# Patient Record
Sex: FEMALE | Race: WHITE | NOT HISPANIC OR LATINO
[De-identification: names, ages, dates, MRNs, and addresses within clinical notes are randomized per-mention and may not be internally consistent; named-entity substitution may affect disease eponyms.]

---

## 2017-12-21 ENCOUNTER — APPOINTMENT (OUTPATIENT)
Dept: OBGYN | Facility: CLINIC | Age: 52
End: 2017-12-21
Payer: COMMERCIAL

## 2017-12-21 ENCOUNTER — RESULT REVIEW (OUTPATIENT)
Age: 52
End: 2017-12-21

## 2017-12-21 PROCEDURE — 36415 COLL VENOUS BLD VENIPUNCTURE: CPT

## 2017-12-21 PROCEDURE — 99396 PREV VISIT EST AGE 40-64: CPT

## 2018-12-14 ENCOUNTER — LABORATORY RESULT (OUTPATIENT)
Age: 53
End: 2018-12-14

## 2018-12-14 ENCOUNTER — APPOINTMENT (OUTPATIENT)
Dept: PULMONOLOGY | Facility: CLINIC | Age: 53
End: 2018-12-14
Payer: COMMERCIAL

## 2018-12-14 VITALS
RESPIRATION RATE: 17 BRPM | HEIGHT: 62 IN | SYSTOLIC BLOOD PRESSURE: 136 MMHG | TEMPERATURE: 97.9 F | HEART RATE: 66 BPM | OXYGEN SATURATION: 95 % | DIASTOLIC BLOOD PRESSURE: 80 MMHG | BODY MASS INDEX: 25.03 KG/M2 | WEIGHT: 136 LBS

## 2018-12-14 DIAGNOSIS — Z78.9 OTHER SPECIFIED HEALTH STATUS: ICD-10-CM

## 2018-12-14 DIAGNOSIS — I35.1 NONRHEUMATIC AORTIC (VALVE) INSUFFICIENCY: ICD-10-CM

## 2018-12-14 DIAGNOSIS — J30.1 ALLERGIC RHINITIS DUE TO POLLEN: ICD-10-CM

## 2018-12-14 DIAGNOSIS — Z13.820 ENCOUNTER FOR SCREENING FOR OSTEOPOROSIS: ICD-10-CM

## 2018-12-14 DIAGNOSIS — M19.90 UNSPECIFIED OSTEOARTHRITIS, UNSPECIFIED SITE: ICD-10-CM

## 2018-12-14 LAB
ALBUMIN: 10
BASOPHILS # BLD AUTO: 0.05 K/UL
BASOPHILS NFR BLD AUTO: 0.9 %
BILIRUB UR QL STRIP: NEGATIVE
CLARITY UR: CLEAR
COLLECTION METHOD: NORMAL
CREATININE: 50
EOSINOPHIL # BLD AUTO: 0.16 K/UL
EOSINOPHIL NFR BLD AUTO: 2.9 %
GLUCOSE UR-MCNC: NEGATIVE
HCG UR QL: 0.2 EU/DL
HCT VFR BLD CALC: 37.1 %
HGB BLD-MCNC: 11.8 G/DL
HGB UR QL STRIP.AUTO: NEGATIVE
IMM GRANULOCYTES NFR BLD AUTO: 0 %
KETONES UR-MCNC: NEGATIVE
LEUKOCYTE ESTERASE UR QL STRIP: NEGATIVE
LYMPHOCYTES # BLD AUTO: 2.24 K/UL
LYMPHOCYTES NFR BLD AUTO: 40.1 %
MAN DIFF?: NORMAL
MCHC RBC-ENTMCNC: 28.2 PG
MCHC RBC-ENTMCNC: 31.8 GM/DL
MCV RBC AUTO: 88.8 FL
MICROALBUMIN/CREAT UR TEST STR-RTO: 30
MONOCYTES # BLD AUTO: 0.41 K/UL
MONOCYTES NFR BLD AUTO: 7.3 %
NEUTROPHILS # BLD AUTO: 2.72 K/UL
NEUTROPHILS NFR BLD AUTO: 48.8 %
NITRITE UR QL STRIP: NEGATIVE
PH UR STRIP: 7
PLATELET # BLD AUTO: 279 K/UL
POCT - HEMOGLOBIN (HGB), QUANTITATIVE, TRANSCUTANEOUS: 12.2
PROT UR STRIP-MCNC: NEGATIVE
RBC # BLD: 4.18 M/UL
RBC # FLD: 15.4 %
SP GR UR STRIP: 1.01
WBC # FLD AUTO: 5.58 K/UL

## 2018-12-14 PROCEDURE — 77080 DXA BONE DENSITY AXIAL: CPT

## 2018-12-14 PROCEDURE — 94729 DIFFUSING CAPACITY: CPT

## 2018-12-14 PROCEDURE — 94010 BREATHING CAPACITY TEST: CPT

## 2018-12-14 PROCEDURE — 36415 COLL VENOUS BLD VENIPUNCTURE: CPT

## 2018-12-14 PROCEDURE — 94727 GAS DIL/WSHOT DETER LNG VOL: CPT

## 2018-12-14 PROCEDURE — 93000 ELECTROCARDIOGRAM COMPLETE: CPT

## 2018-12-14 PROCEDURE — 82570 ASSAY OF URINE CREATININE: CPT | Mod: QW

## 2018-12-14 PROCEDURE — G0008: CPT

## 2018-12-14 PROCEDURE — 90686 IIV4 VACC NO PRSV 0.5 ML IM: CPT

## 2018-12-14 PROCEDURE — 88738 HGB QUANT TRANSCUTANEOUS: CPT

## 2018-12-14 PROCEDURE — 71046 X-RAY EXAM CHEST 2 VIEWS: CPT

## 2018-12-14 PROCEDURE — 82044 UR ALBUMIN SEMIQUANTITATIVE: CPT | Mod: QW

## 2018-12-14 PROCEDURE — 99396 PREV VISIT EST AGE 40-64: CPT | Mod: 25

## 2018-12-15 LAB
25(OH)D3 SERPL-MCNC: 29.7 NG/ML
ALBUMIN SERPL ELPH-MCNC: 5.1 G/DL
ALP BLD-CCNC: 83 U/L
ALT SERPL-CCNC: 19 U/L
ANION GAP SERPL CALC-SCNC: 14 MMOL/L
AST SERPL-CCNC: 24 U/L
BILIRUB SERPL-MCNC: 0.2 MG/DL
BUN SERPL-MCNC: 14 MG/DL
CALCIUM SERPL-MCNC: 10.2 MG/DL
CHLORIDE SERPL-SCNC: 104 MMOL/L
CHOLEST SERPL-MCNC: 214 MG/DL
CHOLEST/HDLC SERPL: 2.5 RATIO
CO2 SERPL-SCNC: 27 MMOL/L
CREAT SERPL-MCNC: 0.84 MG/DL
GLUCOSE SERPL-MCNC: 93 MG/DL
HBA1C MFR BLD HPLC: 5.2 %
HCV AB SER QL: NONREACTIVE
HCV S/CO RATIO: 0.11 S/CO
HDLC SERPL-MCNC: 86 MG/DL
LDLC SERPL CALC-MCNC: 120 MG/DL
POTASSIUM SERPL-SCNC: 4.2 MMOL/L
PROT SERPL-MCNC: 7.1 G/DL
SODIUM SERPL-SCNC: 145 MMOL/L
T3 SERPL-MCNC: 79 NG/DL
T3RU NFR SERPL: 1.03 INDEX
T4 FREE SERPL-MCNC: 1.5 NG/DL
T4 SERPL-MCNC: 7.8 UG/DL
TRIGL SERPL-MCNC: 41 MG/DL
TSH SERPL-ACNC: 1.9 UIU/ML

## 2018-12-18 LAB — ACE BLD-CCNC: 58 U/L

## 2019-01-18 ENCOUNTER — APPOINTMENT (OUTPATIENT)
Dept: OBGYN | Facility: CLINIC | Age: 54
End: 2019-01-18
Payer: COMMERCIAL

## 2019-01-18 ENCOUNTER — RESULT REVIEW (OUTPATIENT)
Age: 54
End: 2019-01-18

## 2019-01-18 PROCEDURE — 99396 PREV VISIT EST AGE 40-64: CPT

## 2019-03-08 ENCOUNTER — APPOINTMENT (OUTPATIENT)
Dept: DERMATOLOGY | Facility: CLINIC | Age: 54
End: 2019-03-08
Payer: COMMERCIAL

## 2019-03-08 VITALS
BODY MASS INDEX: 24.84 KG/M2 | HEIGHT: 62 IN | DIASTOLIC BLOOD PRESSURE: 72 MMHG | WEIGHT: 135 LBS | SYSTOLIC BLOOD PRESSURE: 110 MMHG

## 2019-03-08 DIAGNOSIS — L81.4 OTHER MELANIN HYPERPIGMENTATION: ICD-10-CM

## 2019-03-08 PROCEDURE — 99243 OFF/OP CNSLTJ NEW/EST LOW 30: CPT

## 2019-07-15 ENCOUNTER — RECORD ABSTRACTING (OUTPATIENT)
Age: 54
End: 2019-07-15

## 2019-07-15 DIAGNOSIS — Z87.39 PERSONAL HISTORY OF OTHER DISEASES OF THE MUSCULOSKELETAL SYSTEM AND CONNECTIVE TISSUE: ICD-10-CM

## 2019-07-15 DIAGNOSIS — N84.0 POLYP OF CORPUS UTERI: ICD-10-CM

## 2019-07-15 DIAGNOSIS — Z83.3 FAMILY HISTORY OF DIABETES MELLITUS: ICD-10-CM

## 2019-07-15 DIAGNOSIS — Z82.49 FAMILY HISTORY OF ISCHEMIC HEART DISEASE AND OTHER DISEASES OF THE CIRCULATORY SYSTEM: ICD-10-CM

## 2019-07-15 DIAGNOSIS — Z86.39 PERSONAL HISTORY OF OTHER ENDOCRINE, NUTRITIONAL AND METABOLIC DISEASE: ICD-10-CM

## 2019-07-15 DIAGNOSIS — H40.033 ANATOMICAL NARROW ANGLE, BILATERAL: ICD-10-CM

## 2019-12-20 ENCOUNTER — APPOINTMENT (OUTPATIENT)
Dept: PULMONOLOGY | Facility: CLINIC | Age: 54
End: 2019-12-20
Payer: COMMERCIAL

## 2019-12-20 ENCOUNTER — NON-APPOINTMENT (OUTPATIENT)
Age: 54
End: 2019-12-20

## 2019-12-20 VITALS
WEIGHT: 137 LBS | HEIGHT: 62 IN | OXYGEN SATURATION: 99 % | SYSTOLIC BLOOD PRESSURE: 107 MMHG | BODY MASS INDEX: 25.21 KG/M2 | RESPIRATION RATE: 16 BRPM | DIASTOLIC BLOOD PRESSURE: 72 MMHG | HEART RATE: 60 BPM

## 2019-12-20 DIAGNOSIS — Z23 ENCOUNTER FOR IMMUNIZATION: ICD-10-CM

## 2019-12-20 LAB
25(OH)D3 SERPL-MCNC: 30 NG/ML
ALBUMIN SERPL ELPH-MCNC: 4.7 G/DL
ALBUMIN: 10
ALP BLD-CCNC: 84 U/L
ALT SERPL-CCNC: 17 U/L
ANION GAP SERPL CALC-SCNC: 11 MMOL/L
AST SERPL-CCNC: 25 U/L
BASOPHILS # BLD AUTO: 0.08 K/UL
BASOPHILS NFR BLD AUTO: 1.4 %
BILIRUB SERPL-MCNC: 0.3 MG/DL
BILIRUB UR QL STRIP: NEGATIVE
BUN SERPL-MCNC: 16 MG/DL
CALCIUM SERPL-MCNC: 9.8 MG/DL
CHLORIDE SERPL-SCNC: 103 MMOL/L
CHOLEST SERPL-MCNC: 204 MG/DL
CHOLEST/HDLC SERPL: 2.3 RATIO
CLARITY UR: CLEAR
CO2 SERPL-SCNC: 27 MMOL/L
CREAT SERPL-MCNC: 0.87 MG/DL
CREATININE: 10
EOSINOPHIL # BLD AUTO: 0.13 K/UL
EOSINOPHIL NFR BLD AUTO: 2.2 %
ESTIMATED AVERAGE GLUCOSE: 111 MG/DL
GLUCOSE SERPL-MCNC: 104 MG/DL
GLUCOSE UR-MCNC: NEGATIVE
HBA1C MFR BLD HPLC: 5.5 %
HCG UR QL: 0.2 EU/DL
HCT VFR BLD CALC: 40.1 %
HDLC SERPL-MCNC: 87 MG/DL
HGB BLD-MCNC: 12.7 G/DL
HGB UR QL STRIP.AUTO: NEGATIVE
IMM GRANULOCYTES NFR BLD AUTO: 0.2 %
KETONES UR-MCNC: NEGATIVE
LDLC SERPL CALC-MCNC: 109 MG/DL
LEUKOCYTE ESTERASE UR QL STRIP: NORMAL
LYMPHOCYTES # BLD AUTO: 2.4 K/UL
LYMPHOCYTES NFR BLD AUTO: 41.4 %
MAN DIFF?: NORMAL
MCHC RBC-ENTMCNC: 29.8 PG
MCHC RBC-ENTMCNC: 31.7 GM/DL
MCV RBC AUTO: 94.1 FL
MICROALBUMIN/CREAT UR TEST STR-RTO: <30
MONOCYTES # BLD AUTO: 0.45 K/UL
MONOCYTES NFR BLD AUTO: 7.8 %
NEUTROPHILS # BLD AUTO: 2.73 K/UL
NEUTROPHILS NFR BLD AUTO: 47 %
NITRITE UR QL STRIP: NEGATIVE
PH UR STRIP: 6
PLATELET # BLD AUTO: 258 K/UL
POCT - HEMOGLOBIN (HGB), QUANTITATIVE, TRANSCUTANEOUS: 12.7
POTASSIUM SERPL-SCNC: 4.6 MMOL/L
PROT SERPL-MCNC: 6.6 G/DL
PROT UR STRIP-MCNC: NEGATIVE
RBC # BLD: 4.26 M/UL
RBC # FLD: 13.4 %
SODIUM SERPL-SCNC: 141 MMOL/L
SP GR UR STRIP: <=1.005
T4 FREE SERPL-MCNC: 1.3 NG/DL
T4 SERPL-MCNC: 7.2 UG/DL
TRIGL SERPL-MCNC: 42 MG/DL
TSH SERPL-ACNC: 2.22 UIU/ML
WBC # FLD AUTO: 5.8 K/UL

## 2019-12-20 PROCEDURE — 94729 DIFFUSING CAPACITY: CPT

## 2019-12-20 PROCEDURE — 93000 ELECTROCARDIOGRAM COMPLETE: CPT

## 2019-12-20 PROCEDURE — 71046 X-RAY EXAM CHEST 2 VIEWS: CPT

## 2019-12-20 PROCEDURE — 82044 UR ALBUMIN SEMIQUANTITATIVE: CPT | Mod: NC,QW

## 2019-12-20 PROCEDURE — 88738 HGB QUANT TRANSCUTANEOUS: CPT

## 2019-12-20 PROCEDURE — 99396 PREV VISIT EST AGE 40-64: CPT | Mod: 25

## 2019-12-20 PROCEDURE — 94010 BREATHING CAPACITY TEST: CPT

## 2019-12-20 PROCEDURE — 81003 URINALYSIS AUTO W/O SCOPE: CPT | Mod: QW

## 2019-12-20 PROCEDURE — 36415 COLL VENOUS BLD VENIPUNCTURE: CPT

## 2019-12-20 PROCEDURE — G0008: CPT

## 2019-12-20 PROCEDURE — 90686 IIV4 VACC NO PRSV 0.5 ML IM: CPT

## 2019-12-20 PROCEDURE — 94727 GAS DIL/WSHOT DETER LNG VOL: CPT

## 2019-12-20 NOTE — PHYSICAL EXAM
[General Appearance - Well Developed] : well developed [Normal Appearance] : normal appearance [Well Groomed] : well groomed [General Appearance - Well Nourished] : well nourished [No Deformities] : no deformities [General Appearance - In No Acute Distress] : no acute distress [Normal Conjunctiva] : the conjunctiva exhibited no abnormalities [Eyelids - No Xanthelasma] : the eyelids demonstrated no xanthelasmas [Normal Oropharynx] : normal oropharynx [I] : I [Neck Appearance] : the appearance of the neck was normal [Neck Cervical Mass (___cm)] : no neck mass was observed [Jugular Venous Distention Increased] : there was no jugular-venous distention [Thyroid Diffuse Enlargement] : the thyroid was not enlarged [Thyroid Nodule] : there were no palpable thyroid nodules [Heart Rate And Rhythm] : heart rate and rhythm were normal [Heart Sounds] : normal S1 and S2 [Murmurs] : no murmurs present [Arterial Pulses Normal] : the arterial pulses were normal [Respiration, Rhythm And Depth] : normal respiratory rhythm and effort [Exaggerated Use Of Accessory Muscles For Inspiration] : no accessory muscle use [Auscultation Breath Sounds / Voice Sounds] : lungs were clear to auscultation bilaterally [Chest Palpation] : palpation of the chest revealed no abnormalities [Lungs Percussion] : the lungs were normal to percussion [Bowel Sounds] : normal bowel sounds [Abdomen Soft] : soft [Abdomen Tenderness] : non-tender [Abdomen Mass (___ Cm)] : no abdominal mass palpated [Gait - Sufficient For Exercise Testing] : the gait was sufficient for exercise testing [Abnormal Walk] : normal gait [Nail Clubbing] : no clubbing of the fingernails [Petechial Hemorrhages (___cm)] : no petechial hemorrhages [Cyanosis, Localized] : no localized cyanosis [] : no ischemic changes [Sensation] : the sensory exam was normal to light touch and pinprick [Deep Tendon Reflexes (DTR)] : deep tendon reflexes were 2+ and symmetric [No Focal Deficits] : no focal deficits [Oriented To Time, Place, And Person] : oriented to person, place, and time [Affect] : the affect was normal [Mood] : the mood was normal [Impaired Insight] : insight and judgment were intact [FreeTextEntry1] : Nonicteric

## 2019-12-20 NOTE — DISCUSSION/SUMMARY
[FreeTextEntry1] : Well visit completed\par Stable sarcoidosis\par History of aortic insufficiency\par His mitral valve prolapse\par Former tobacco smoker\par Flu vaccination administered\par Complete blood work pending\par Patient requests as recommended cardiology evaluation  Everardo Germain MD\par Due for echocardiogram\par Office follow-up 1 year\par Once cleared by cardiology should then embark on aerobic exercise program

## 2019-12-20 NOTE — HISTORY OF PRESENT ILLNESS
[___ Year Quit] : ~He/She~ quit smoking in [unfilled] [Former] : is a former smoker [___ Pack Year History] : [unfilled] pack year history [None] : The patient is not currently on any medications [FreeTextEntry1] :  female well visit\par No active complaints at this time\par Past medical history sarcoidosis\par Diagnosis with with a skin biopsy dating back to 1985\par Borderline hypercholesterolemia\par Hayfever\par Some arthritic changes\par Reported history of aortic insufficiency\par Mild anemia reported\par colonoscopy 2015 - told 10 yr f/u\par  mammogram  Sept Oct 2019\par GYN due\par Past surgical history right knee surgery x2\par Social history minimal tobacco smoker ages [age] less than 1 pack/day\par Alcohol social with wine\par Occupational history unremarkable\par \par No history of toxic chemical inhalational exposures\par Postmenopausal\par Family history both mother and father living patient's questions whether father has diabetes\par Mother she states does not see doctors no medical diseases reported\par Anoscopy she states up-to-date 3 years ago was told to have one 10 years from initial colonoscopy\par Gynecology yearly up-to-date January including Pap smear mammogram\par \par No prescription medications\par Patient patient does take multiple over-the-counter supplements\par Niacin 500 mg twice daily calcium 1200 mg daily iron 65 mg daily vitamin D 50 MCG's daily vitamin C 500 mg twice daily vitamin D 134 mg daily magnesium 25 250 mg daily B12 1000 mg orally fish oil 1000 mg beta-carotene 3000 mg of multivitamin\par

## 2019-12-20 NOTE — PROCEDURE
[FreeTextEntry1] : Chest x-ray PA lateral projection 12/20/2019 history sarcoidosis\par Cardiac size normal\par Lung fields are clear without definite evidence of parenchymal infiltrates dominant pulmonary nodules pleural effusions pneumothorax\par Hilum and mediastinum normal\par Mild left lateral scoliosis\par No prior films for review\par \par \par Bone density December 14, 2018 spine and hip consistent with osteopenia\par \par PFT without bronchodilator 12/202019\par Flow rates normal\par Lung volumes normal\par Diffusion normal 89 % predicted\par HGB  12.7\par \par Blood  draw\par  UA  noted\par \par EKG 12/20/2019\par NSR atypical normal  variant\par

## 2019-12-21 LAB
HCV AB SER QL: NONREACTIVE
HCV S/CO RATIO: 0.14 S/CO

## 2019-12-25 LAB — ACE BLD-CCNC: 57 U/L

## 2020-02-02 ENCOUNTER — FORM ENCOUNTER (OUTPATIENT)
Age: 55
End: 2020-02-02

## 2020-02-03 ENCOUNTER — RESULT REVIEW (OUTPATIENT)
Age: 55
End: 2020-02-03

## 2020-02-03 ENCOUNTER — APPOINTMENT (OUTPATIENT)
Dept: OBGYN | Facility: CLINIC | Age: 55
End: 2020-02-03
Payer: COMMERCIAL

## 2020-02-03 PROCEDURE — 99396 PREV VISIT EST AGE 40-64: CPT

## 2020-02-14 ENCOUNTER — APPOINTMENT (OUTPATIENT)
Dept: CARDIOLOGY | Facility: CLINIC | Age: 55
End: 2020-02-14
Payer: COMMERCIAL

## 2020-02-14 ENCOUNTER — TRANSCRIPTION ENCOUNTER (OUTPATIENT)
Age: 55
End: 2020-02-14

## 2020-02-14 ENCOUNTER — NON-APPOINTMENT (OUTPATIENT)
Age: 55
End: 2020-02-14

## 2020-02-14 VITALS
RESPIRATION RATE: 15 BRPM | HEART RATE: 58 BPM | DIASTOLIC BLOOD PRESSURE: 82 MMHG | BODY MASS INDEX: 25.95 KG/M2 | WEIGHT: 141 LBS | SYSTOLIC BLOOD PRESSURE: 122 MMHG | HEIGHT: 62 IN

## 2020-02-14 DIAGNOSIS — Z87.891 PERSONAL HISTORY OF NICOTINE DEPENDENCE: ICD-10-CM

## 2020-02-14 PROCEDURE — 93000 ELECTROCARDIOGRAM COMPLETE: CPT

## 2020-02-14 PROCEDURE — 99204 OFFICE O/P NEW MOD 45 MIN: CPT

## 2020-06-09 ENCOUNTER — APPOINTMENT (OUTPATIENT)
Dept: CARDIOLOGY | Facility: CLINIC | Age: 55
End: 2020-06-09

## 2020-12-21 ENCOUNTER — APPOINTMENT (OUTPATIENT)
Dept: PULMONOLOGY | Facility: CLINIC | Age: 55
End: 2020-12-21
Payer: COMMERCIAL

## 2020-12-21 ENCOUNTER — NON-APPOINTMENT (OUTPATIENT)
Age: 55
End: 2020-12-21

## 2020-12-21 ENCOUNTER — LABORATORY RESULT (OUTPATIENT)
Age: 55
End: 2020-12-21

## 2020-12-21 VITALS
DIASTOLIC BLOOD PRESSURE: 72 MMHG | HEART RATE: 76 BPM | TEMPERATURE: 98.2 F | SYSTOLIC BLOOD PRESSURE: 116 MMHG | OXYGEN SATURATION: 95 %

## 2020-12-21 DIAGNOSIS — Z20.828 CONTACT WITH AND (SUSPECTED) EXPOSURE TO OTHER VIRAL COMMUNICABLE DISEASES: ICD-10-CM

## 2020-12-21 LAB
25(OH)D3 SERPL-MCNC: 30.9 NG/ML
ALBUMIN SERPL ELPH-MCNC: 4.6 G/DL
ALBUMIN: 10
ALP BLD-CCNC: 87 U/L
ALT SERPL-CCNC: 31 U/L
ANION GAP SERPL CALC-SCNC: 8 MMOL/L
AST SERPL-CCNC: 31 U/L
BASOPHILS # BLD AUTO: 0.06 K/UL
BASOPHILS NFR BLD AUTO: 1.3 %
BILIRUB SERPL-MCNC: 0.4 MG/DL
BILIRUB UR QL STRIP: NEGATIVE
BUN SERPL-MCNC: 11 MG/DL
CALCIUM SERPL-MCNC: 9.6 MG/DL
CHLORIDE SERPL-SCNC: 105 MMOL/L
CHOLEST SERPL-MCNC: 209 MG/DL
CLARITY UR: CLEAR
CO2 SERPL-SCNC: 28 MMOL/L
COLLECTION METHOD: NORMAL
CREAT SERPL-MCNC: 0.95 MG/DL
CREATININE: 100
EOSINOPHIL # BLD AUTO: 0.14 K/UL
EOSINOPHIL NFR BLD AUTO: 2.9 %
ESTIMATED AVERAGE GLUCOSE: 105 MG/DL
GLUCOSE SERPL-MCNC: 104 MG/DL
GLUCOSE UR-MCNC: NEGATIVE
HBA1C MFR BLD HPLC: 5.3 %
HCG UR QL: 0.2 EU/DL
HCT VFR BLD CALC: 37.7 %
HCV AB SER QL: NONREACTIVE
HCV S/CO RATIO: 0.1 S/CO
HDLC SERPL-MCNC: 78 MG/DL
HGB BLD-MCNC: 12.1 G/DL
HGB UR QL STRIP.AUTO: NEGATIVE
IMM GRANULOCYTES NFR BLD AUTO: 0.2 %
KETONES UR-MCNC: NEGATIVE
LDLC SERPL CALC-MCNC: 117 MG/DL
LEUKOCYTE ESTERASE UR QL STRIP: NORMAL
LYMPHOCYTES # BLD AUTO: 2.03 K/UL
LYMPHOCYTES NFR BLD AUTO: 42.5 %
MAN DIFF?: NORMAL
MCHC RBC-ENTMCNC: 29.9 PG
MCHC RBC-ENTMCNC: 32.1 GM/DL
MCV RBC AUTO: 93.1 FL
MICROALBUMIN/CREAT UR TEST STR-RTO: 30
MONOCYTES # BLD AUTO: 0.37 K/UL
MONOCYTES NFR BLD AUTO: 7.7 %
NEUTROPHILS # BLD AUTO: 2.17 K/UL
NEUTROPHILS NFR BLD AUTO: 45.4 %
NITRITE UR QL STRIP: NEGATIVE
NONHDLC SERPL-MCNC: 131 MG/DL
PH UR STRIP: 7.5
PLATELET # BLD AUTO: 231 K/UL
POTASSIUM SERPL-SCNC: 4.2 MMOL/L
PROT SERPL-MCNC: 6.4 G/DL
PROT UR STRIP-MCNC: NEGATIVE
PSA SERPL-MCNC: <0.01 NG/ML
RBC # BLD: 4.05 M/UL
RBC # FLD: 13.2 %
SODIUM SERPL-SCNC: 141 MMOL/L
SP GR UR STRIP: 1.02
T4 FREE SERPL-MCNC: 1.3 NG/DL
T4 SERPL-MCNC: 6.9 UG/DL
TRIGL SERPL-MCNC: 71 MG/DL
TSH SERPL-ACNC: 2.22 UIU/ML
WBC # FLD AUTO: 4.78 K/UL

## 2020-12-21 PROCEDURE — 36415 COLL VENOUS BLD VENIPUNCTURE: CPT

## 2020-12-21 PROCEDURE — 82044 UR ALBUMIN SEMIQUANTITATIVE: CPT | Mod: NC,QW

## 2020-12-21 PROCEDURE — 77085 DXA BONE DENSITY AXL VRT FX: CPT

## 2020-12-21 PROCEDURE — 99072 ADDL SUPL MATRL&STAF TM PHE: CPT

## 2020-12-21 PROCEDURE — 71046 X-RAY EXAM CHEST 2 VIEWS: CPT

## 2020-12-21 PROCEDURE — 99396 PREV VISIT EST AGE 40-64: CPT | Mod: 25

## 2020-12-21 PROCEDURE — 81003 URINALYSIS AUTO W/O SCOPE: CPT | Mod: QW

## 2020-12-21 PROCEDURE — 93000 ELECTROCARDIOGRAM COMPLETE: CPT

## 2020-12-21 RX ORDER — HYDROCORTISONE 25 MG/G
2.5 CREAM TOPICAL
Qty: 30 | Refills: 0 | Status: DISCONTINUED | COMMUNITY
Start: 2020-02-03

## 2020-12-21 NOTE — PROCEDURE
[FreeTextEntry1] : X-ray PA lateral December 21, 2020\par History sarcoidosis\par Normal cardiac size\par Left lateral rotary scoliosis\par Clear lungs\par No parenchymal infiltrates pleural effusions dominant pulmonary nodules\par Jen mediastinum unremarkable\par Interval change compared to a chest x-ray of December 20, 2019\par \par EKG 12/21/20\par NSR\par \par DEXA 12/21/2020\par AP Spine osteoporosis\par HIP Left Osteopenia\par \par Bone density December 14, 2018 spine and hip consistent with osteopenia\par \par PFT without bronchodilator 12/202019\par Flow rates normal\par Lung volumes normal\par Diffusion normal 89 % predicted\par HGB  12.7\par \par Blood  draw\par  UA  noted\par \par EKG 12/20/2019\par NSR atypical normal  variant\par

## 2020-12-21 NOTE — HISTORY OF PRESENT ILLNESS
[Former] : is a former smoker [___ Year Quit] : ~He/She~ quit smoking in [unfilled] [___ Pack Year History] : [unfilled] pack year history [None] : The patient is not currently on any medications [FreeTextEntry1] :  female well visit\par No active complaints at this time\par Past medical history sarcoidosis\par Diagnosis with with a skin biopsy dating back to 1985\par Borderline hypercholesterolemia\par Hayfever\par Some arthritic changes\par Reported history of aortic insufficiency\par Mild anemia reported\par colonoscopy 2015 - told 10 yr f/u\par  mammogram  Sept Oct 2019\par GYN due\par Past surgical history right knee surgery x2\par Social history minimal tobacco smoker ages [age] less than 1 pack/day\par Alcohol social with wine\par Occupational history unremarkable\par \par No history of toxic chemical inhalational exposures\par Postmenopausal\par Family history both mother and father living patient's questions whether father has diabetes\par Mother she states does not see doctors no medical diseases reported\par Anoscopy she states up-to-date 3 years ago was told to have one 10 years from initial colonoscopy\par Gynecology yearly up-to-date January including Pap smear mammogram\par \par No prescription medications\par Patient patient does take multiple over-the-counter supplements\par Niacin 500 mg twice daily calcium 1200 mg daily iron 65 mg daily vitamin D 50 MCG's daily vitamin C 500 mg twice daily vitamin D 134 mg daily magnesium 25 250 mg daily B12 1000 mg orally fish oil 1000 mg beta-carotene 3000 mg of multivitamin\par

## 2020-12-21 NOTE — PHYSICAL EXAM
[General Appearance - Well Developed] : well developed [Normal Appearance] : normal appearance [Well Groomed] : well groomed [General Appearance - Well Nourished] : well nourished [No Deformities] : no deformities [General Appearance - In No Acute Distress] : no acute distress [Normal Conjunctiva] : the conjunctiva exhibited no abnormalities [Eyelids - No Xanthelasma] : the eyelids demonstrated no xanthelasmas [Normal Oropharynx] : normal oropharynx [I] : I [Neck Appearance] : the appearance of the neck was normal [Neck Cervical Mass (___cm)] : no neck mass was observed [Jugular Venous Distention Increased] : there was no jugular-venous distention [Thyroid Diffuse Enlargement] : the thyroid was not enlarged [Thyroid Nodule] : there were no palpable thyroid nodules [Heart Rate And Rhythm] : heart rate and rhythm were normal [Heart Sounds] : normal S1 and S2 [Murmurs] : no murmurs present [Arterial Pulses Normal] : the arterial pulses were normal [Respiration, Rhythm And Depth] : normal respiratory rhythm and effort [Exaggerated Use Of Accessory Muscles For Inspiration] : no accessory muscle use [Auscultation Breath Sounds / Voice Sounds] : lungs were clear to auscultation bilaterally [Chest Palpation] : palpation of the chest revealed no abnormalities [Lungs Percussion] : the lungs were normal to percussion [Bowel Sounds] : normal bowel sounds [Abdomen Soft] : soft [Abdomen Tenderness] : non-tender [Abdomen Mass (___ Cm)] : no abdominal mass palpated [Abnormal Walk] : normal gait [Gait - Sufficient For Exercise Testing] : the gait was sufficient for exercise testing [Nail Clubbing] : no clubbing of the fingernails [Cyanosis, Localized] : no localized cyanosis [Petechial Hemorrhages (___cm)] : no petechial hemorrhages [] : no ischemic changes [Deep Tendon Reflexes (DTR)] : deep tendon reflexes were 2+ and symmetric [Sensation] : the sensory exam was normal to light touch and pinprick [No Focal Deficits] : no focal deficits [Oriented To Time, Place, And Person] : oriented to person, place, and time [Impaired Insight] : insight and judgment were intact [Affect] : the affect was normal [Mood] : the mood was normal [FreeTextEntry1] : Nonicteric

## 2020-12-21 NOTE — DISCUSSION/SUMMARY
[FreeTextEntry1] : Well visit completed\par Osteoporsis\par Stable sarcoidosis\par History of aortic insufficiency\par  mitral valve prolapse\par Former tobacco smoker\par Flu vaccination administered\par Complete blood work pending\par cardiology evaluation  Everardo Germain MD\par Office follow-up 1 year\par ENDO- consult tx protocol\par declined PFT

## 2020-12-22 LAB
SARS-COV-2 IGG SERPL IA-ACNC: <0.1 INDEX
SARS-COV-2 IGG SERPL QL IA: NEGATIVE

## 2020-12-23 ENCOUNTER — APPOINTMENT (OUTPATIENT)
Dept: CARDIOLOGY | Facility: CLINIC | Age: 55
End: 2020-12-23

## 2020-12-24 LAB — ACE BLD-CCNC: 61 U/L

## 2021-02-10 ENCOUNTER — RESULT REVIEW (OUTPATIENT)
Age: 56
End: 2021-02-10

## 2021-02-10 ENCOUNTER — FORM ENCOUNTER (OUTPATIENT)
Age: 56
End: 2021-02-10

## 2021-02-11 ENCOUNTER — APPOINTMENT (OUTPATIENT)
Dept: OBGYN | Facility: CLINIC | Age: 56
End: 2021-02-11
Payer: COMMERCIAL

## 2021-02-11 ENCOUNTER — FORM ENCOUNTER (OUTPATIENT)
Age: 56
End: 2021-02-11

## 2021-02-11 DIAGNOSIS — K64.9 UNSPECIFIED HEMORRHOIDS: ICD-10-CM

## 2021-02-11 PROCEDURE — 99396 PREV VISIT EST AGE 40-64: CPT

## 2021-02-11 PROCEDURE — 99072 ADDL SUPL MATRL&STAF TM PHE: CPT

## 2021-02-16 ENCOUNTER — FORM ENCOUNTER (OUTPATIENT)
Age: 56
End: 2021-02-16

## 2021-03-03 ENCOUNTER — FORM ENCOUNTER (OUTPATIENT)
Age: 56
End: 2021-03-03

## 2021-03-29 ENCOUNTER — FORM ENCOUNTER (OUTPATIENT)
Age: 56
End: 2021-03-29

## 2021-07-06 ENCOUNTER — FORM ENCOUNTER (OUTPATIENT)
Age: 56
End: 2021-07-06

## 2021-12-16 ENCOUNTER — NON-APPOINTMENT (OUTPATIENT)
Age: 56
End: 2021-12-16

## 2021-12-16 ENCOUNTER — APPOINTMENT (OUTPATIENT)
Dept: PULMONOLOGY | Facility: CLINIC | Age: 56
End: 2021-12-16
Payer: COMMERCIAL

## 2021-12-16 VITALS
BODY MASS INDEX: 24.84 KG/M2 | OXYGEN SATURATION: 100 % | HEIGHT: 62 IN | RESPIRATION RATE: 16 BRPM | HEART RATE: 74 BPM | WEIGHT: 135 LBS | SYSTOLIC BLOOD PRESSURE: 126 MMHG | DIASTOLIC BLOOD PRESSURE: 80 MMHG | TEMPERATURE: 97.8 F

## 2021-12-16 DIAGNOSIS — R82.90 UNSPECIFIED ABNORMAL FINDINGS IN URINE: ICD-10-CM

## 2021-12-16 DIAGNOSIS — I35.1 NONRHEUMATIC AORTIC (VALVE) INSUFFICIENCY: ICD-10-CM

## 2021-12-16 LAB
25(OH)D3 SERPL-MCNC: 30.9 NG/ML
ALBUMIN SERPL ELPH-MCNC: 4.6 G/DL
ALBUMIN: 10
ALP BLD-CCNC: 53 U/L
ALT SERPL-CCNC: 15 U/L
ANION GAP SERPL CALC-SCNC: 13 MMOL/L
AST SERPL-CCNC: 22 U/L
BASOPHILS # BLD AUTO: 0.05 K/UL
BASOPHILS NFR BLD AUTO: 1 %
BILIRUB SERPL-MCNC: 0.3 MG/DL
BILIRUB UR QL STRIP: NORMAL
BUN SERPL-MCNC: 11 MG/DL
CALCIUM SERPL-MCNC: 9.6 MG/DL
CHLORIDE SERPL-SCNC: 103 MMOL/L
CHOLEST SERPL-MCNC: 204 MG/DL
CLARITY UR: CLEAR
CO2 SERPL-SCNC: 24 MMOL/L
COLLECTION METHOD: NORMAL
COVID-19 NUCLEOCAPSID  GAM ANTIBODY INTERPRETATION: NEGATIVE
COVID-19 SPIKE DOMAIN ANTIBODY INTERPRETATION: POSITIVE
CREAT SERPL-MCNC: 0.84 MG/DL
CREATININE: 100
EOSINOPHIL # BLD AUTO: 0.11 K/UL
EOSINOPHIL NFR BLD AUTO: 2.2 %
GLUCOSE SERPL-MCNC: 103 MG/DL
GLUCOSE UR-MCNC: NORMAL
HCG UR QL: 0.2 EU/DL
HCT VFR BLD CALC: 40.5 %
HDLC SERPL-MCNC: 78 MG/DL
HGB BLD-MCNC: 12.9 G/DL
HGB UR QL STRIP.AUTO: NORMAL
IMM GRANULOCYTES NFR BLD AUTO: 0.2 %
KETONES UR-MCNC: NORMAL
LDLC SERPL CALC-MCNC: 113 MG/DL
LEUKOCYTE ESTERASE UR QL STRIP: NORMAL
LYMPHOCYTES # BLD AUTO: 1.97 K/UL
LYMPHOCYTES NFR BLD AUTO: 39.4 %
MAN DIFF?: NORMAL
MCHC RBC-ENTMCNC: 29.7 PG
MCHC RBC-ENTMCNC: 31.9 GM/DL
MCV RBC AUTO: 93.3 FL
MICROALBUMIN/CREAT UR TEST STR-RTO: 30
MONOCYTES # BLD AUTO: 0.4 K/UL
MONOCYTES NFR BLD AUTO: 8 %
NEUTROPHILS # BLD AUTO: 2.46 K/UL
NEUTROPHILS NFR BLD AUTO: 49.2 %
NITRITE UR QL STRIP: NORMAL
NONHDLC SERPL-MCNC: 126 MG/DL
PH UR STRIP: 7.5
PLATELET # BLD AUTO: 264 K/UL
POTASSIUM SERPL-SCNC: 4.7 MMOL/L
PROT SERPL-MCNC: 6.6 G/DL
PROT UR STRIP-MCNC: NORMAL
RBC # BLD: 4.34 M/UL
RBC # FLD: 13.1 %
SARS-COV-2 AB SERPL IA-ACNC: >250 U/ML
SARS-COV-2 AB SERPL QL IA: 0.09 INDEX
SODIUM SERPL-SCNC: 140 MMOL/L
SP GR UR STRIP: 1.01
T4 SERPL-MCNC: 7.4 UG/DL
TRIGL SERPL-MCNC: 62 MG/DL
TSH SERPL-ACNC: 2.82 UIU/ML
WBC # FLD AUTO: 5 K/UL

## 2021-12-16 PROCEDURE — 36415 COLL VENOUS BLD VENIPUNCTURE: CPT

## 2021-12-16 PROCEDURE — 99396 PREV VISIT EST AGE 40-64: CPT | Mod: 25

## 2021-12-16 PROCEDURE — 81003 URINALYSIS AUTO W/O SCOPE: CPT | Mod: QW

## 2021-12-16 PROCEDURE — 71046 X-RAY EXAM CHEST 2 VIEWS: CPT

## 2021-12-16 PROCEDURE — 82044 UR ALBUMIN SEMIQUANTITATIVE: CPT | Mod: NC,QW

## 2021-12-16 PROCEDURE — 93000 ELECTROCARDIOGRAM COMPLETE: CPT

## 2021-12-16 NOTE — HISTORY OF PRESENT ILLNESS
[Former] : is a former smoker [___ Year Quit] : ~He/She~ quit smoking in [unfilled] [___ Pack Year History] : [unfilled] pack year history [None] : The patient is not currently on any medications [FreeTextEntry1] :  female well visit\par No active complaints at this time\par Past medical history sarcoidosis\par Diagnosis with with a skin biopsy dating back to 1985\par Borderline hypercholesterolemia\par Hayfever\par Some arthritic changes\par Reported history of aortic insufficiency\par Mild anemia reported\par colonoscopy 2015 - told 10 yr f/u\par  mammogram  pt states up to date with GYN\par Past surgical history right knee surgery x2\par Social history minimal tobacco smoker ages [age] less than 1 pack/day\par Alcohol social with wine\par Occupational history unremarkable\par \par No history of toxic chemical inhalational exposures\par Postmenopausal\par Family history both mother and father living patient's questions whether father has diabetes\par Mother she states does not see doctors no medical diseases reported\par Anoscopy she states up-to-date 3 years ago was told to have one 10 years from initial colonoscopy\par Gynecology yearly up-to-date January including Pap smear mammogram\par \par No prescription medications\par Patient patient does take multiple over-the-counter supplements\par Niacin 500 mg twice daily calcium 1200 mg daily iron 65 mg daily vitamin D 50 MCG's daily vitamin C 500 mg twice daily vitamin D 134 mg daily magnesium 25 250 mg daily B12 1000 mg orally fish oil 1000 mg beta-carotene 3000 mg of multivitamin\par \par COVID PFIZER plus booster

## 2021-12-16 NOTE — PROCEDURE
[FreeTextEntry1] : Chest x-ray PA lateral December 16, 2021\par Sarcoidosis\par Cardiac size normal\par Clear lung fields\par  No Infiltrates pleural effusions with dominant pulmonary nodules\par Left lateral scoliosis\par Lamination right hemidiaphragm\par No appreciable dominant pulmonary nodules\par Jen mediastinum otherwise unremarkable\par \par EKG 12/16/21 \par NSR\par \par X-ray PA lateral December 21, 2020\par History sarcoidosis\par Normal cardiac size\par Left lateral rotary scoliosis\par Clear lungs\par No parenchymal infiltrates pleural effusions dominant pulmonary nodules\par Jen mediastinum unremarkable\par Interval change compared to a chest x-ray of December 20, 2019\par \par EKG 12/21/20\par NSR\par \par DEXA 12/21/2020\par AP Spine osteoporosis\par HIP Left Osteopenia\par \par Bone density December 14, 2018 spine and hip consistent with osteopenia\par \par PFT without bronchodilator 12/202019\par Flow rates normal\par Lung volumes normal\par Diffusion normal 89 % predicted\par HGB  12.7\par \par Blood  draw\par  UA  noted\par \par EKG 12/20/2019\par NSR atypical normal  variant\par

## 2021-12-17 LAB
ACE BLD-CCNC: 53 U/L
ESTIMATED AVERAGE GLUCOSE: 114 MG/DL
HBA1C MFR BLD HPLC: 5.6 %

## 2021-12-18 LAB — BACTERIA UR CULT: NORMAL

## 2022-03-16 PROBLEM — K64.9 HEMORRHOIDS, UNSPECIFIED HEMORRHOID TYPE: Status: ACTIVE | Noted: 2019-07-15

## 2022-05-10 ENCOUNTER — APPOINTMENT (OUTPATIENT)
Dept: OBGYN | Facility: CLINIC | Age: 57
End: 2022-05-10
Payer: COMMERCIAL

## 2022-05-10 VITALS
DIASTOLIC BLOOD PRESSURE: 75 MMHG | SYSTOLIC BLOOD PRESSURE: 118 MMHG | HEIGHT: 62 IN | BODY MASS INDEX: 24.66 KG/M2 | WEIGHT: 134 LBS | HEART RATE: 69 BPM

## 2022-05-10 PROCEDURE — 99396 PREV VISIT EST AGE 40-64: CPT

## 2022-05-10 NOTE — HISTORY OF PRESENT ILLNESS
[Patient reported mammogram was normal] : Patient reported mammogram was normal [Patient reported PAP Smear was normal] : Patient reported PAP Smear was normal [Patient reported bone density results were normal] : Patient reported bone density results were normal [Patient reported colonoscopy was normal] : Patient reported colonoscopy was normal [FreeTextEntry1] : SCOTT PICKETT is a 56 year old presenting for annual. pt is doing well has no complaints.\par \par ob hx:  x2  [Mammogramdate] : 3/2021 [PapSmeardate] : 2/2021 [BoneDensityDate] : 2/2021 [ColonoscopyDate] : 2021

## 2022-05-10 NOTE — END OF VISIT
[FreeTextEntry3] : I, Zainab Moss, acted solely as a scribe for Dr. Violet Hinojosa, on 05/10/2022. \par \par All medical record entries made by the scribe were at my, Dr. Violet Hinojosa., direction and personally dictated by me on 05/10/2022. I have personally reviewed the chart and agree that the record accurately reflects my personal performance of the history, physical exam, assessment and plan.\par

## 2022-05-10 NOTE — PLAN
[FreeTextEntry1] : SCOTT PICKETT is a 56 year old presenting for annual\par -pap was done today \par -BSE\par -rx for mammo/sono\par -colon up to date\par -DEXA UTD (followed by endo)\par -RTO 1 year

## 2022-05-12 LAB — HPV HIGH+LOW RISK DNA PNL CVX: NOT DETECTED

## 2022-05-17 ENCOUNTER — TRANSCRIPTION ENCOUNTER (OUTPATIENT)
Age: 57
End: 2022-05-17

## 2022-05-17 LAB — CYTOLOGY CVX/VAG DOC THIN PREP: ABNORMAL

## 2022-06-29 ENCOUNTER — RESULT REVIEW (OUTPATIENT)
Age: 57
End: 2022-06-29

## 2022-06-29 ENCOUNTER — APPOINTMENT (OUTPATIENT)
Dept: ULTRASOUND IMAGING | Facility: CLINIC | Age: 57
End: 2022-06-29

## 2022-06-29 ENCOUNTER — APPOINTMENT (OUTPATIENT)
Dept: MAMMOGRAPHY | Facility: CLINIC | Age: 57
End: 2022-06-29

## 2022-06-29 PROCEDURE — 77067 SCR MAMMO BI INCL CAD: CPT

## 2022-06-29 PROCEDURE — 76641 ULTRASOUND BREAST COMPLETE: CPT | Mod: 50

## 2022-06-29 PROCEDURE — 77063 BREAST TOMOSYNTHESIS BI: CPT

## 2022-12-30 ENCOUNTER — APPOINTMENT (OUTPATIENT)
Dept: PULMONOLOGY | Facility: CLINIC | Age: 57
End: 2022-12-30
Payer: COMMERCIAL

## 2022-12-30 ENCOUNTER — NON-APPOINTMENT (OUTPATIENT)
Age: 57
End: 2022-12-30

## 2022-12-30 VITALS
HEART RATE: 59 BPM | OXYGEN SATURATION: 99 % | SYSTOLIC BLOOD PRESSURE: 108 MMHG | HEIGHT: 62 IN | TEMPERATURE: 97.4 F | WEIGHT: 130 LBS | BODY MASS INDEX: 23.92 KG/M2 | DIASTOLIC BLOOD PRESSURE: 68 MMHG

## 2022-12-30 DIAGNOSIS — M81.0 AGE-RELATED OSTEOPOROSIS W/OUT CURRENT PATHOLOGICAL FRACTURE: ICD-10-CM

## 2022-12-30 LAB
ALBUMIN: 10
BILIRUB UR QL STRIP: NORMAL
CLARITY UR: CLEAR
COLLECTION METHOD: NORMAL
CREATININE: 10
GLUCOSE UR-MCNC: NORMAL
HCG UR QL: 0.2 EU/DL
HGB UR QL STRIP.AUTO: NORMAL
KETONES UR-MCNC: NORMAL
LEUKOCYTE ESTERASE UR QL STRIP: NORMAL
MICROALBUMIN/CREAT UR TEST STR-RTO: <30
NITRITE UR QL STRIP: NORMAL
PH UR STRIP: 6.5
POCT - HEMOGLOBIN (HGB), QUANTITATIVE, TRANSCUTANEOUS: 13.4
PROT UR STRIP-MCNC: NORMAL
SP GR UR STRIP: 1.01

## 2022-12-30 PROCEDURE — 82044 UR ALBUMIN SEMIQUANTITATIVE: CPT | Mod: NC,QW

## 2022-12-30 PROCEDURE — 94727 GAS DIL/WSHOT DETER LNG VOL: CPT

## 2022-12-30 PROCEDURE — ZZZZZ: CPT

## 2022-12-30 PROCEDURE — 36415 COLL VENOUS BLD VENIPUNCTURE: CPT

## 2022-12-30 PROCEDURE — 94729 DIFFUSING CAPACITY: CPT

## 2022-12-30 PROCEDURE — 93000 ELECTROCARDIOGRAM COMPLETE: CPT | Mod: 59

## 2022-12-30 PROCEDURE — 94010 BREATHING CAPACITY TEST: CPT

## 2022-12-30 PROCEDURE — 77085 DXA BONE DENSITY AXL VRT FX: CPT

## 2022-12-30 PROCEDURE — 81003 URINALYSIS AUTO W/O SCOPE: CPT | Mod: QW

## 2022-12-30 PROCEDURE — 71046 X-RAY EXAM CHEST 2 VIEWS: CPT

## 2022-12-30 PROCEDURE — 88738 HGB QUANT TRANSCUTANEOUS: CPT

## 2022-12-30 PROCEDURE — 99396 PREV VISIT EST AGE 40-64: CPT | Mod: 25

## 2022-12-30 RX ORDER — NIACIN 1000 MG/1
1000 TABLET, EXTENDED RELEASE ORAL TWICE DAILY
Refills: 0 | Status: DISCONTINUED | COMMUNITY
Start: 2020-02-17 | End: 2022-12-30

## 2022-12-30 NOTE — HISTORY OF PRESENT ILLNESS
[___ Year Quit] : ~He/She~ quit smoking in [unfilled] [___ Pack Year History] : [unfilled] pack year history [None] : The patient is not currently on any medications [Former] : former [< 20 pack-years] : < 20 pack-years [TextBox_29] : Discontinued all smoking in her mid 20s [FreeTextEntry1] :  female well visit\par GYN up to date\par No active complaints at this time\par Past medical history sarcoidosis\par Diagnosis with with a skin biopsy dating back to 1985\par Borderline hypercholesterolemia\par Hayfever\par Some arthritic changes\par Reported history of aortic insufficiency\par Mild anemia reported\par colonoscopy 2015 - told 10 yr f/u\par  mammogram  pt states up to date with GYN\par Past surgical history right knee surgery x2\par Social history minimal tobacco smoker ages [age] less than 1 pack/day\par Alcohol social with wine\par Occupational history unremarkable\par \par No history of toxic chemical inhalational exposures\par Postmenopausal\par Family history both mother and father living patient's questions whether father has diabetes\par Mother she states does not see doctors no medical diseases reported\par Anoscopy she states up-to-date 3 years ago was told to have one 10 years from initial colonoscopy\par Gynecology yearly up-to-date January including Pap smear mammogram\par \par No prescription medications\par Patient patient does take multiple over-the-counter supplements\par Niacin 500 mg twice daily calcium 1200 mg daily iron 65 mg daily vitamin D 50 MCG's daily vitamin C 500 mg twice daily vitamin D 134 mg daily magnesium 25 250 mg daily B12 1000 mg orally fish oil 1000 mg beta-carotene 3000 mg of multivitamin\par \par COVID PFIZER plus booster

## 2022-12-30 NOTE — DISCUSSION/SUMMARY
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

## 2022-12-30 NOTE — PROCEDURE
[FreeTextEntry1] : PFT December 30, 2022\par Indication sarcoidosis\par Flow rates normal\par Lung volumes normal\par TLC 97% predicted\par Diffusion normal range 88% predicted\par Hemoglobin 13.4\par \par EKG 12/30/2022\par Heart rate 56\par Sinus bradycardia\par Occasional ectopy\par Within normal limits\par [place] QT intervals normal range\par \par \par Bone density 12/30/2022\par AP spine L1-L4 osteopenia\par Left femoral neck osteopenia\par Total left hip normal\par Compared to data of 12/21/2020 the AP spine L1-L4 demonstrates interval improvement of the T score and interval improvement of the total left hip\par Follow-up 2 years\par \par Chest x-ray PA lateral 12/30/2022\par Cardiac size normal\par Mild left lateral scoliosis\par Clear lung fields without parenchymal infiltrates pleural effusions dominant pulmonary nodules\par No pneumothorax\par Jen mediastinum unremarkable no appreciable mediastinal widening\par Impression clear lungs\par Mild scoliosis\par \par EKG 12/16/21 \par NSR\par \par X-ray PA lateral December 21, 2020\par History sarcoidosis\par Normal cardiac size\par Left lateral rotary scoliosis\par Clear lungs\par No parenchymal infiltrates pleural effusions dominant pulmonary nodules\par Jen mediastinum unremarkable\par Interval change compared to a chest x-ray of December 20, 2019\par \par EKG 12/21/20\par NSR\par \par DEXA 12/21/2020\par AP Spine osteoporosis\par HIP Left Osteopenia\par \par Bone density December 14, 2018 spine and hip consistent with osteopenia\par \par PFT without bronchodilator 12/202019\par Flow rates normal\par Lung volumes normal\par Diffusion normal 89 % predicted\par HGB  12.7\par \par Blood  draw\par  UA  noted\par \par EKG 12/20/2019\par NSR atypical normal  variant\par

## 2022-12-31 ENCOUNTER — NON-APPOINTMENT (OUTPATIENT)
Age: 57
End: 2022-12-31

## 2022-12-31 LAB
ALBUMIN SERPL ELPH-MCNC: 4.9 G/DL
ALP BLD-CCNC: 51 U/L
ALT SERPL-CCNC: 21 U/L
ANION GAP SERPL CALC-SCNC: 12 MMOL/L
AST SERPL-CCNC: 26 U/L
BASOPHILS # BLD AUTO: 0.05 K/UL
BASOPHILS NFR BLD AUTO: 0.9 %
BILIRUB DIRECT SERPL-MCNC: 0.1 MG/DL
BILIRUB INDIRECT SERPL-MCNC: 0.3 MG/DL
BILIRUB SERPL-MCNC: 0.4 MG/DL
BUN SERPL-MCNC: 16 MG/DL
CALCIUM SERPL-MCNC: 9.8 MG/DL
CHLORIDE SERPL-SCNC: 103 MMOL/L
CHOLEST SERPL-MCNC: 263 MG/DL
CO2 SERPL-SCNC: 25 MMOL/L
COVID-19 NUCLEOCAPSID  GAM ANTIBODY INTERPRETATION: NEGATIVE
CREAT SERPL-MCNC: 0.82 MG/DL
EGFR: 82 ML/MIN/1.73M2
EOSINOPHIL # BLD AUTO: 0.13 K/UL
EOSINOPHIL NFR BLD AUTO: 2.2 %
ESTIMATED AVERAGE GLUCOSE: 108 MG/DL
GLUCOSE SERPL-MCNC: 95 MG/DL
HBA1C MFR BLD HPLC: 5.4 %
HCT VFR BLD CALC: 42.2 %
HCV AB SER QL: NONREACTIVE
HCV S/CO RATIO: 0.07 S/CO
HDLC SERPL-MCNC: 85 MG/DL
HGB BLD-MCNC: 13.3 G/DL
IMM GRANULOCYTES NFR BLD AUTO: 0.2 %
LDLC SERPL CALC-MCNC: 165 MG/DL
LYMPHOCYTES # BLD AUTO: 1.97 K/UL
LYMPHOCYTES NFR BLD AUTO: 33.8 %
MAN DIFF?: NORMAL
MCHC RBC-ENTMCNC: 30.1 PG
MCHC RBC-ENTMCNC: 31.5 GM/DL
MCV RBC AUTO: 95.5 FL
MONOCYTES # BLD AUTO: 0.44 K/UL
MONOCYTES NFR BLD AUTO: 7.6 %
NEUTROPHILS # BLD AUTO: 3.22 K/UL
NEUTROPHILS NFR BLD AUTO: 55.3 %
NONHDLC SERPL-MCNC: 178 MG/DL
PLATELET # BLD AUTO: 247 K/UL
POTASSIUM SERPL-SCNC: 4.9 MMOL/L
PROT SERPL-MCNC: 6.5 G/DL
RBC # BLD: 4.42 M/UL
RBC # FLD: 13.2 %
SARS-COV-2 AB SERPL QL IA: 0.08 INDEX
SODIUM SERPL-SCNC: 140 MMOL/L
T4 FREE SERPL-MCNC: 1.2 NG/DL
T4 SERPL-MCNC: 7.9 UG/DL
TRIGL SERPL-MCNC: 65 MG/DL
TSH SERPL-ACNC: 1.81 UIU/ML
WBC # FLD AUTO: 5.82 K/UL

## 2023-01-03 LAB — ACE BLD-CCNC: 51 U/L

## 2023-02-17 ENCOUNTER — APPOINTMENT (OUTPATIENT)
Dept: PULMONOLOGY | Facility: CLINIC | Age: 58
End: 2023-02-17
Payer: COMMERCIAL

## 2023-02-17 VITALS — HEART RATE: 59 BPM | SYSTOLIC BLOOD PRESSURE: 110 MMHG | DIASTOLIC BLOOD PRESSURE: 66 MMHG | OXYGEN SATURATION: 100 %

## 2023-02-17 PROCEDURE — 36415 COLL VENOUS BLD VENIPUNCTURE: CPT

## 2023-02-17 PROCEDURE — 99213 OFFICE O/P EST LOW 20 MIN: CPT | Mod: 25

## 2023-02-17 NOTE — PROCEDURE
[FreeTextEntry1] : PFT December 30, 2022\par Indication sarcoidosis\par Flow rates normal\par Lung volumes normal\par TLC 97% predicted\par Diffusion normal range 88% predicted\par Hemoglobin 13.4\par \par EKG 12/30/2022\par Heart rate 56\par Sinus bradycardia\par Occasional ectopy\par Within normal limits\par DC QT intervals normal range\par \par \par Bone density 12/30/2022\par AP spine L1-L4 osteopenia\par Left femoral neck osteopenia\par Total left hip normal\par Compared to data of 12/21/2020 the AP spine L1-L4 demonstrates interval improvement of the T score and interval improvement of the total left hip\par Follow-up 2 years\par \par Chest x-ray PA lateral 12/30/2022\par Cardiac size normal\par Mild left lateral scoliosis\par Clear lung fields without parenchymal infiltrates pleural effusions dominant pulmonary nodules\par No pneumothorax\par Jen mediastinum unremarkable no appreciable mediastinal widening\par Impression clear lungs\par Mild scoliosis\par \par EKG 12/16/21 \par NSR\par \par X-ray PA lateral December 21, 2020\par History sarcoidosis\par Normal cardiac size\par Left lateral rotary scoliosis\par Clear lungs\par No parenchymal infiltrates pleural effusions dominant pulmonary nodules\par Jen mediastinum unremarkable\par Interval change compared to a chest x-ray of December 20, 2019\par \par EKG 12/21/20\par NSR\par \par DEXA 12/21/2020\par AP Spine osteoporosis\par HIP Left Osteopenia\par \par Bone density December 14, 2018 spine and hip consistent with osteopenia\par \par PFT without bronchodilator 12/202019\par Flow rates normal\par Lung volumes normal\par Diffusion normal 89 % predicted\par HGB  12.7\par \par Blood  draw\par  UA  noted\par \par EKG 12/20/2019\par NSR atypical normal  variant\par

## 2023-02-17 NOTE — DISCUSSION/SUMMARY
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

## 2023-02-17 NOTE — HISTORY OF PRESENT ILLNESS
[< 20 pack-years] : < 20 pack-years [Former] : is a former smoker [___ Year Quit] : ~He/She~ quit smoking in [unfilled] [___ Pack Year History] : [unfilled] pack year history [None] : The patient is not currently on any medications [TextBox_29] : Discontinued all smoking in her mid 20s [FreeTextEntry1] :  female well visit  Dec 2022\par GYN up to date\par No active complaints at this time\par Past medical history sarcoidosis\par Diagnosis with with a skin biopsy dating back to 1985\par Borderline hypercholesterolemia\par Hayfever\par Some arthritic changes\par Reported history of aortic insufficiency\par Mild anemia reported\par colonoscopy 2015 - told 10 yr f/u\par  mammogram  pt states up to date with GYN\par Past surgical history right knee surgery x2\par Social history minimal tobacco smoker ages [age] less than 1 pack/day\par Alcohol social with wine\par Occupational history unremarkable\par \par No history of toxic chemical inhalational exposures\par Postmenopausal\par Family history both mother and father living patient's questions whether father has diabetes\par Mother she states does not see doctors no medical diseases reported\par Anoscopy she states up-to-date 3 years ago was told to have one 10 years from initial colonoscopy\par Gynecology yearly up-to-date January including Pap smear mammogram\par \par No prescription medications\par Patient patient does take multiple over-the-counter supplements\par Niacin 500 mg twice daily calcium 1200 mg daily iron 65 mg daily vitamin D 50 MCG's daily vitamin C 500 mg twice daily vitamin D 134 mg daily magnesium 25 250 mg daily B12 1000 mg orally fish oil 1000 mg beta-carotene 3000 mg of multivitamin\par \par COVID PFIZER plus booster

## 2023-02-18 LAB
ALBUMIN SERPL ELPH-MCNC: 4.5 G/DL
ALP BLD-CCNC: 55 U/L
ALT SERPL-CCNC: 25 U/L
AST SERPL-CCNC: 26 U/L
BILIRUB DIRECT SERPL-MCNC: 0.1 MG/DL
BILIRUB INDIRECT SERPL-MCNC: 0.2 MG/DL
BILIRUB SERPL-MCNC: 0.2 MG/DL
CHOLEST SERPL-MCNC: 176 MG/DL
HDLC SERPL-MCNC: 79 MG/DL
LDLC SERPL CALC-MCNC: 86 MG/DL
NONHDLC SERPL-MCNC: 97 MG/DL
PROT SERPL-MCNC: 6.6 G/DL
TRIGL SERPL-MCNC: 57 MG/DL

## 2023-03-31 ENCOUNTER — APPOINTMENT (OUTPATIENT)
Dept: DERMATOLOGY | Facility: CLINIC | Age: 58
End: 2023-03-31
Payer: COMMERCIAL

## 2023-03-31 DIAGNOSIS — D22.9 MELANOCYTIC NEVI, UNSPECIFIED: ICD-10-CM

## 2023-03-31 DIAGNOSIS — Z12.83 ENCOUNTER FOR SCREENING FOR MALIGNANT NEOPLASM OF SKIN: ICD-10-CM

## 2023-03-31 DIAGNOSIS — L82.1 OTHER SEBORRHEIC KERATOSIS: ICD-10-CM

## 2023-03-31 PROCEDURE — 99203 OFFICE O/P NEW LOW 30 MIN: CPT

## 2023-04-01 PROBLEM — D22.9 MULTIPLE BENIGN MELANOCYTIC NEVI: Status: ACTIVE | Noted: 2023-03-31

## 2023-04-01 PROBLEM — Z12.83 SCREENING EXAM FOR SKIN CANCER: Status: ACTIVE | Noted: 2019-03-08

## 2023-04-01 PROBLEM — L82.1 SEBORRHEIC KERATOSES: Status: ACTIVE | Noted: 2019-03-08

## 2023-04-24 ENCOUNTER — APPOINTMENT (OUTPATIENT)
Dept: ORTHOPEDIC SURGERY | Facility: CLINIC | Age: 58
End: 2023-04-24
Payer: COMMERCIAL

## 2023-04-24 VITALS
OXYGEN SATURATION: 99 % | SYSTOLIC BLOOD PRESSURE: 138 MMHG | HEART RATE: 62 BPM | BODY MASS INDEX: 23.74 KG/M2 | TEMPERATURE: 97.7 F | WEIGHT: 129 LBS | DIASTOLIC BLOOD PRESSURE: 80 MMHG | HEIGHT: 62 IN

## 2023-04-24 PROCEDURE — 99204 OFFICE O/P NEW MOD 45 MIN: CPT

## 2023-04-24 PROCEDURE — 73562 X-RAY EXAM OF KNEE 3: CPT | Mod: RT

## 2023-05-15 ENCOUNTER — APPOINTMENT (OUTPATIENT)
Dept: ORTHOPEDIC SURGERY | Facility: CLINIC | Age: 58
End: 2023-05-15
Payer: COMMERCIAL

## 2023-05-15 DIAGNOSIS — M23.91 UNSPECIFIED INTERNAL DERANGEMENT OF RIGHT KNEE: ICD-10-CM

## 2023-05-15 PROCEDURE — 99214 OFFICE O/P EST MOD 30 MIN: CPT

## 2023-05-19 ENCOUNTER — APPOINTMENT (OUTPATIENT)
Dept: MRI IMAGING | Facility: CLINIC | Age: 58
End: 2023-05-19

## 2023-05-22 ENCOUNTER — APPOINTMENT (OUTPATIENT)
Dept: PULMONOLOGY | Facility: CLINIC | Age: 58
End: 2023-05-22
Payer: COMMERCIAL

## 2023-05-22 ENCOUNTER — APPOINTMENT (OUTPATIENT)
Dept: ORTHOPEDIC SURGERY | Facility: CLINIC | Age: 58
End: 2023-05-22
Payer: COMMERCIAL

## 2023-05-22 ENCOUNTER — NON-APPOINTMENT (OUTPATIENT)
Age: 58
End: 2023-05-22

## 2023-05-22 VITALS
DIASTOLIC BLOOD PRESSURE: 79 MMHG | HEART RATE: 67 BPM | WEIGHT: 130 LBS | SYSTOLIC BLOOD PRESSURE: 132 MMHG | BODY MASS INDEX: 23.92 KG/M2 | OXYGEN SATURATION: 97 % | HEIGHT: 62 IN

## 2023-05-22 DIAGNOSIS — S83.511D SPRAIN OF ANTERIOR CRUCIATE LIGAMENT OF RIGHT KNEE, SUBSEQUENT ENCOUNTER: ICD-10-CM

## 2023-05-22 DIAGNOSIS — M70.41 PREPATELLAR BURSITIS, RIGHT KNEE: ICD-10-CM

## 2023-05-22 LAB
CHOLEST SERPL-MCNC: 179 MG/DL
HDLC SERPL-MCNC: 76 MG/DL
LDLC SERPL CALC-MCNC: 91 MG/DL
NONHDLC SERPL-MCNC: 103 MG/DL
TRIGL SERPL-MCNC: 60 MG/DL

## 2023-05-22 PROCEDURE — 36415 COLL VENOUS BLD VENIPUNCTURE: CPT

## 2023-05-22 PROCEDURE — 99213 OFFICE O/P EST LOW 20 MIN: CPT | Mod: 25

## 2023-05-22 PROCEDURE — 99214 OFFICE O/P EST MOD 30 MIN: CPT

## 2023-05-22 RX ORDER — IBANDRONATE SODIUM 150 MG/1
150 TABLET ORAL
Qty: 1 | Refills: 0 | Status: ACTIVE | COMMUNITY
Start: 2022-12-22

## 2023-05-22 NOTE — DISCUSSION/SUMMARY
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

## 2023-05-22 NOTE — PROCEDURE
[FreeTextEntry1] : PFT December 30, 2022\par Indication sarcoidosis\par Flow rates normal\par Lung volumes normal\par TLC 97% predicted\par Diffusion normal range 88% predicted\par Hemoglobin 13.4\par \par EKG 12/30/2022\par Heart rate 56\par Sinus bradycardia\par Occasional ectopy\par Within normal limits\par NM QT intervals normal range\par \par \par Bone density 12/30/2022\par AP spine L1-L4 osteopenia\par Left femoral neck osteopenia\par Total left hip normal\par Compared to data of 12/21/2020 the AP spine L1-L4 demonstrates interval improvement of the T score and interval improvement of the total left hip\par Follow-up 2 years\par \par Chest x-ray PA lateral 12/30/2022\par Cardiac size normal\par Mild left lateral scoliosis\par Clear lung fields without parenchymal infiltrates pleural effusions dominant pulmonary nodules\par No pneumothorax\par Jen mediastinum unremarkable no appreciable mediastinal widening\par Impression clear lungs\par Mild scoliosis\par \par EKG 12/16/21 \par NSR\par \par X-ray PA lateral December 21, 2020\par History sarcoidosis\par Normal cardiac size\par Left lateral rotary scoliosis\par Clear lungs\par No parenchymal infiltrates pleural effusions dominant pulmonary nodules\par Jen mediastinum unremarkable\par Interval change compared to a chest x-ray of December 20, 2019\par \par EKG 12/21/20\par NSR\par \par DEXA 12/21/2020\par AP Spine osteoporosis\par HIP Left Osteopenia\par \par Bone density December 14, 2018 spine and hip consistent with osteopenia\par \par PFT without bronchodilator 12/202019\par Flow rates normal\par Lung volumes normal\par Diffusion normal 89 % predicted\par HGB  12.7\par \par Blood  draw\par \par EKG 12/20/2019\par NSR atypical normal  variant\par

## 2023-06-12 ENCOUNTER — OUTPATIENT (OUTPATIENT)
Dept: OUTPATIENT SERVICES | Facility: HOSPITAL | Age: 58
LOS: 1 days | End: 2023-06-12
Payer: COMMERCIAL

## 2023-06-12 VITALS
WEIGHT: 131.84 LBS | SYSTOLIC BLOOD PRESSURE: 107 MMHG | HEIGHT: 62 IN | RESPIRATION RATE: 14 BRPM | TEMPERATURE: 97 F | HEART RATE: 68 BPM | DIASTOLIC BLOOD PRESSURE: 65 MMHG | OXYGEN SATURATION: 98 %

## 2023-06-12 DIAGNOSIS — Z01.818 ENCOUNTER FOR OTHER PREPROCEDURAL EXAMINATION: ICD-10-CM

## 2023-06-12 DIAGNOSIS — Z98.89 OTHER SPECIFIED POSTPROCEDURAL STATES: Chronic | ICD-10-CM

## 2023-06-12 DIAGNOSIS — Z98.890 OTHER SPECIFIED POSTPROCEDURAL STATES: Chronic | ICD-10-CM

## 2023-06-12 DIAGNOSIS — S83.281D OTHER TEAR OF LATERAL MENISCUS, CURRENT INJURY, RIGHT KNEE, SUBSEQUENT ENCOUNTER: ICD-10-CM

## 2023-06-12 DIAGNOSIS — L05.91 PILONIDAL CYST WITHOUT ABSCESS: Chronic | ICD-10-CM

## 2023-06-12 DIAGNOSIS — S83.281A OTHER TEAR OF LATERAL MENISCUS, CURRENT INJURY, RIGHT KNEE, INITIAL ENCOUNTER: ICD-10-CM

## 2023-06-12 LAB
ANION GAP SERPL CALC-SCNC: 8 MMOL/L — SIGNIFICANT CHANGE UP (ref 5–17)
BUN SERPL-MCNC: 15 MG/DL — SIGNIFICANT CHANGE UP (ref 7–23)
CALCIUM SERPL-MCNC: 9.2 MG/DL — SIGNIFICANT CHANGE UP (ref 8.4–10.5)
CHLORIDE SERPL-SCNC: 103 MMOL/L — SIGNIFICANT CHANGE UP (ref 96–108)
CO2 SERPL-SCNC: 28 MMOL/L — SIGNIFICANT CHANGE UP (ref 22–31)
CREAT SERPL-MCNC: 0.81 MG/DL — SIGNIFICANT CHANGE UP (ref 0.5–1.3)
EGFR: 84 ML/MIN/1.73M2 — SIGNIFICANT CHANGE UP
GLUCOSE SERPL-MCNC: 98 MG/DL — SIGNIFICANT CHANGE UP (ref 70–99)
HCT VFR BLD CALC: 38.5 % — SIGNIFICANT CHANGE UP (ref 34.5–45)
HGB BLD-MCNC: 12.6 G/DL — SIGNIFICANT CHANGE UP (ref 11.5–15.5)
MCHC RBC-ENTMCNC: 29.4 PG — SIGNIFICANT CHANGE UP (ref 27–34)
MCHC RBC-ENTMCNC: 32.7 GM/DL — SIGNIFICANT CHANGE UP (ref 32–36)
MCV RBC AUTO: 89.7 FL — SIGNIFICANT CHANGE UP (ref 80–100)
NRBC # BLD: 0 /100 WBCS — SIGNIFICANT CHANGE UP (ref 0–0)
PLATELET # BLD AUTO: 274 K/UL — SIGNIFICANT CHANGE UP (ref 150–400)
POTASSIUM SERPL-MCNC: 3.7 MMOL/L — SIGNIFICANT CHANGE UP (ref 3.5–5.3)
POTASSIUM SERPL-SCNC: 3.7 MMOL/L — SIGNIFICANT CHANGE UP (ref 3.5–5.3)
RBC # BLD: 4.29 M/UL — SIGNIFICANT CHANGE UP (ref 3.8–5.2)
RBC # FLD: 12.6 % — SIGNIFICANT CHANGE UP (ref 10.3–14.5)
SODIUM SERPL-SCNC: 139 MMOL/L — SIGNIFICANT CHANGE UP (ref 135–145)
WBC # BLD: 7.73 K/UL — SIGNIFICANT CHANGE UP (ref 3.8–10.5)
WBC # FLD AUTO: 7.73 K/UL — SIGNIFICANT CHANGE UP (ref 3.8–10.5)

## 2023-06-12 PROCEDURE — 85027 COMPLETE CBC AUTOMATED: CPT

## 2023-06-12 PROCEDURE — G0463: CPT

## 2023-06-12 PROCEDURE — 93010 ELECTROCARDIOGRAM REPORT: CPT

## 2023-06-12 PROCEDURE — 36415 COLL VENOUS BLD VENIPUNCTURE: CPT

## 2023-06-12 PROCEDURE — 93005 ELECTROCARDIOGRAM TRACING: CPT

## 2023-06-12 PROCEDURE — 80048 BASIC METABOLIC PNL TOTAL CA: CPT

## 2023-06-12 NOTE — H&P PST ADULT - NSICDXPASTMEDICALHX_GEN_ALL_CORE_FT
PAST MEDICAL HISTORY:  COVID-19 vaccine series completed     Environmental allergies     Hayfever     Heart murmur     Left knee pain     Mild aortic insufficiency     Osteopenia     Right knee pain     Sarcoidosis     Scoliosis     Tear of lateral meniscus of right knee

## 2023-06-12 NOTE — H&P PST ADULT - NSICDXFAMILYHX_GEN_ALL_CORE_FT
FAMILY HISTORY:  Father  Still living? No  Family history of brain tumor, Age at diagnosis: Age Unknown  Family history of prostate cancer, Age at diagnosis: 61-70    Sibling  Still living? Yes, Estimated age: 61-70  Family history of heart attack, Age at diagnosis: Age Unknown    Grandparent  Still living? No  Family history of lung cancer, Age at diagnosis: Age Unknown

## 2023-06-12 NOTE — H&P PST ADULT - HAVE YOU HAD COVID IN THE LAST 60 DAYS?
Writer [facility]. Orders have been placed.    Writer will reach out at the end of the month per pts request as follow up.       Will continue to follow as needed.   
No

## 2023-06-12 NOTE — H&P PST ADULT - HISTORY OF PRESENT ILLNESS
58 yo female presents s/p fall down stairs at home 7 weeks ago resulting in a bucket handle tear or the meniscus. Pain now 2/10 at rest and increased to 5-6/10 with ambulation. Also reports decreased ROM and decreased strength. Denies any prior or current pain relief measures.

## 2023-06-12 NOTE — H&P PST ADULT - MUSCULOSKELETAL
right knee/no joint erythema/no joint warmth/no calf tenderness/decreased ROM/decreased ROM due to pain/joint swelling/strength 5/5 bilateral upper extremities/decreased strength/abnormal gait details… right knee/no joint erythema/no joint warmth/no calf tenderness/decreased ROM/decreased ROM due to pain/joint swelling/strength 5/5 bilateral upper extremities/back exam/decreased strength/abnormal gait

## 2023-06-12 NOTE — H&P PST ADULT - NSANTHOSAYNRD_GEN_A_CORE
neck 14.5 inches/No. ALBERT screening performed.  STOP BANG Legend: 0-2 = LOW Risk; 3-4 = INTERMEDIATE Risk; 5-8 = HIGH Risk

## 2023-06-12 NOTE — H&P PST ADULT - NSICDXPASTSURGICALHX_GEN_ALL_CORE_FT
PAST SURGICAL HISTORY:  History of arthroscopy of right knee     Pilonidal cyst removal 1983    S/P D&C (status post dilation and curettage) 1993

## 2023-06-12 NOTE — H&P PST ADULT - PROBLEM SELECTOR PLAN 1
EUA, operative arthroscopy right knee. medical clearance requested. surgical wash instructions reviewed and verbalized understanding.

## 2023-06-16 ENCOUNTER — APPOINTMENT (OUTPATIENT)
Dept: PULMONOLOGY | Facility: CLINIC | Age: 58
End: 2023-06-16
Payer: COMMERCIAL

## 2023-06-16 ENCOUNTER — APPOINTMENT (OUTPATIENT)
Dept: OBGYN | Facility: CLINIC | Age: 58
End: 2023-06-16
Payer: COMMERCIAL

## 2023-06-16 VITALS
SYSTOLIC BLOOD PRESSURE: 117 MMHG | OXYGEN SATURATION: 98 % | DIASTOLIC BLOOD PRESSURE: 67 MMHG | RESPIRATION RATE: 16 BRPM | HEART RATE: 62 BPM

## 2023-06-16 VITALS
BODY MASS INDEX: 23.74 KG/M2 | DIASTOLIC BLOOD PRESSURE: 74 MMHG | SYSTOLIC BLOOD PRESSURE: 118 MMHG | HEIGHT: 62 IN | WEIGHT: 129 LBS

## 2023-06-16 DIAGNOSIS — Z11.51 ENCOUNTER FOR SCREENING FOR HUMAN PAPILLOMAVIRUS (HPV): ICD-10-CM

## 2023-06-16 DIAGNOSIS — Z01.419 ENCOUNTER FOR GYNECOLOGICAL EXAMINATION (GENERAL) (ROUTINE) W/OUT ABNORMAL FINDINGS: ICD-10-CM

## 2023-06-16 DIAGNOSIS — Z01.811 ENCOUNTER FOR PREPROCEDURAL RESPIRATORY EXAMINATION: ICD-10-CM

## 2023-06-16 PROBLEM — M41.9 SCOLIOSIS, UNSPECIFIED: Chronic | Status: ACTIVE | Noted: 2023-06-12

## 2023-06-16 PROBLEM — R01.1 CARDIAC MURMUR, UNSPECIFIED: Chronic | Status: ACTIVE | Noted: 2023-06-12

## 2023-06-16 PROBLEM — I35.1 NONRHEUMATIC AORTIC (VALVE) INSUFFICIENCY: Chronic | Status: ACTIVE | Noted: 2023-06-12

## 2023-06-16 PROBLEM — Z92.29 PERSONAL HISTORY OF OTHER DRUG THERAPY: Chronic | Status: ACTIVE | Noted: 2023-06-12

## 2023-06-16 PROBLEM — M25.562 PAIN IN LEFT KNEE: Chronic | Status: ACTIVE | Noted: 2023-06-12

## 2023-06-16 PROBLEM — Z91.09 OTHER ALLERGY STATUS, OTHER THAN TO DRUGS AND BIOLOGICAL SUBSTANCES: Chronic | Status: ACTIVE | Noted: 2023-06-12

## 2023-06-16 PROBLEM — M85.80 OTHER SPECIFIED DISORDERS OF BONE DENSITY AND STRUCTURE, UNSPECIFIED SITE: Chronic | Status: ACTIVE | Noted: 2023-06-12

## 2023-06-16 PROBLEM — J30.1 ALLERGIC RHINITIS DUE TO POLLEN: Chronic | Status: ACTIVE | Noted: 2023-06-12

## 2023-06-16 PROBLEM — S83.281A OTHER TEAR OF LATERAL MENISCUS, CURRENT INJURY, RIGHT KNEE, INITIAL ENCOUNTER: Chronic | Status: ACTIVE | Noted: 2023-06-12

## 2023-06-16 PROBLEM — M25.561 PAIN IN RIGHT KNEE: Chronic | Status: ACTIVE | Noted: 2023-06-12

## 2023-06-16 PROCEDURE — 94010 BREATHING CAPACITY TEST: CPT

## 2023-06-16 PROCEDURE — 94727 GAS DIL/WSHOT DETER LNG VOL: CPT

## 2023-06-16 PROCEDURE — 94729 DIFFUSING CAPACITY: CPT

## 2023-06-16 PROCEDURE — ZZZZZ: CPT

## 2023-06-16 PROCEDURE — 95012 NITRIC OXIDE EXP GAS DETER: CPT

## 2023-06-16 PROCEDURE — 99214 OFFICE O/P EST MOD 30 MIN: CPT | Mod: 25

## 2023-06-16 PROCEDURE — 99396 PREV VISIT EST AGE 40-64: CPT

## 2023-06-16 NOTE — PLAN
[FreeTextEntry1] : SCOTT PICKETT is a 57 year old presenting for annual GYN exam\par -Pap/HPV done today \par -Rx for mammo/sono \par -DEXA UTD \par -Colon UTD \par \par RTO 1 year for annual

## 2023-06-16 NOTE — HISTORY OF PRESENT ILLNESS
[FreeTextEntry1] : SCOTT PICKETT is a 57 year old presenting for annual GYN exam. Pt is doing well and has no complaints. 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

## 2023-06-16 NOTE — END OF VISIT
[FreeTextEntry3] : I, Zainab Moss, acted solely as a scribe for Dr. Violet Hinojosa, on 06/16/2023. \par \par All medical record entries made by the scribe were at my, Dr. Violet Hinojosa., direction and personally dictated by me on 06/16/2023. I have personally reviewed the chart and agree that the record accurately reflects my personal performance of the history, physical exam, assessment and plan.\par

## 2023-06-19 ENCOUNTER — TRANSCRIPTION ENCOUNTER (OUTPATIENT)
Age: 58
End: 2023-06-19

## 2023-06-19 NOTE — HISTORY OF PRESENT ILLNESS
[< 20 pack-years] : < 20 pack-years [Former] : is a former smoker [___ Year Quit] : ~He/She~ quit smoking in [unfilled] [___ Pack Year History] : [unfilled] pack year history [None] : The patient is not currently on any medications [TextBox_29] : Discontinued all smoking in her mid 20s [FreeTextEntry1] : R knee arthroscopy pending Tuesday 6/20/23\par [place]\par \par  female well visit  Dec 2022\par GYN up to date\par No active complaints at this time\par Past medical history sarcoidosis\par Diagnosis with with a skin biopsy dating back to 1985\par Borderline hypercholesterolemia\par Hayfever\par Some arthritic changes\par Reported history of aortic insufficiency\par Mild anemia reported\par colonoscopy 2015 - told 10 yr f/u\par  mammogram  pt states up to date with GYN\par Past surgical history right knee surgery x2\par Social history minimal tobacco smoker ages [age] less than 1 pack/day\par Alcohol social with wine\par Occupational history unremarkable\par \par No history of toxic chemical inhalational exposures\par Postmenopausal\par Family history both mother and father living patient's questions whether father has diabetes\par Mother she states does not see doctors no medical diseases reported\par Anoscopy she states up-to-date 3 years ago was told to have one 10 years from initial colonoscopy\par Gynecology yearly up-to-date January including Pap smear mammogram\par \par No prescription medications\par Patient patient does take multiple over-the-counter supplements\par Niacin 500 mg twice daily calcium 1200 mg daily iron 65 mg daily vitamin D 50 MCG's daily vitamin C 500 mg twice daily vitamin D 134 mg daily magnesium 25 250 mg daily B12 1000 mg orally fish oil 1000 mg beta-carotene 3000 mg of multivitamin\par \par COVID PFIZER plus booster

## 2023-06-19 NOTE — DISCUSSION/SUMMARY
[FreeTextEntry1] : Well visit completed Dec 2022\par Osteoporsis-improved to osteopenia follow-up 2 years calcium vitamin D weightbearing exercise walking\par HLD\par Stable sarcoidosis\par History of aortic insufficiency\par  mitral valve prolapse\par Former tobacco smoker\par Flu vaccination administered\par cardiology evaluation  Everardo Germain MD not completed\par ENDO- consult tx protocol\par declined PFT \par COVID Bi Valent completed\par f/u 3  months\par \par Patient is medically cleared for surgery

## 2023-06-19 NOTE — PROCEDURE
[FreeTextEntry1] : Laboratory data\par Orthopedics preop\par CBC normal range\par WBC 7.73 hemoglobin 12.6 hematocrit 38.5\par Platelet count 274,000\par Serum electrolytes normal range\par Serum potassium 3.7\par Serum calcium 9.2\par Renal function normal\par Creatinine 0.81 with GFR 84\par \par EKG June 12, 2023\par Normal sinus rhythm\par \par PFT 6/16/23\par [place] nl flow  rates \par Lung Volumes  nl\par   DLCO 88 %\par HGB 12.6\par \par NIOx  10  ppb WNL 6/16/23\par \par \par PFT December 30, 2022\par Indication sarcoidosis\par Flow rates normal\par Lung volumes normal\par TLC 97% predicted\par Diffusion normal range 88% predicted\par Hemoglobin 13.4\par \par EKG 12/30/2022\par Heart rate 56\par Sinus bradycardia\par Occasional ectopy\par Within normal limits\par IN QT intervals normal range\par \par \par Bone density 12/30/2022\par AP spine L1-L4 osteopenia\par Left femoral neck osteopenia\par Total left hip normal\par Compared to data of 12/21/2020 the AP spine L1-L4 demonstrates interval improvement of the T score and interval improvement of the total left hip\par Follow-up 2 years\par \par Chest x-ray PA lateral 12/30/2022\par Cardiac size normal\par Mild left lateral scoliosis\par Clear lung fields without parenchymal infiltrates pleural effusions dominant pulmonary nodules\par No pneumothorax\par Jen mediastinum unremarkable no appreciable mediastinal widening\par Impression clear lungs\par Mild scoliosis\par \par EKG 12/16/21 \par NSR\par \par X-ray PA lateral December 21, 2020\par History sarcoidosis\par Normal cardiac size\par Left lateral rotary scoliosis\par Clear lungs\par No parenchymal infiltrates pleural effusions dominant pulmonary nodules\par Jen mediastinum unremarkable\par Interval change compared to a chest x-ray of December 20, 2019\par \par EKG 12/21/20\par NSR\par \par DEXA 12/21/2020\par AP Spine osteoporosis\par HIP Left Osteopenia\par \par Bone density December 14, 2018 spine and hip consistent with osteopenia\par \par PFT without bronchodilator 12/202019\par Flow rates normal\par Lung volumes normal\par Diffusion normal 89 % predicted\par HGB  12.7\par \par Blood  draw\par \par EKG 12/20/2019\par NSR atypical normal  variant\par

## 2023-06-19 NOTE — CONSULT LETTER
[Dear  ___] : Dear  [unfilled], [Courtesy Letter:] : I had the pleasure of seeing your patient, [unfilled], in my office today. [Please see my note below.] : Please see my note below. [Consult Closing:] : Thank you very much for allowing me to participate in the care of this patient.  If you have any questions, please do not hesitate to contact me. [Sincerely,] : Sincerely, [___] : [unfilled] [FreeTextEntry3] : .sig

## 2023-06-20 ENCOUNTER — APPOINTMENT (OUTPATIENT)
Dept: ORTHOPEDIC SURGERY | Facility: HOSPITAL | Age: 58
End: 2023-06-20

## 2023-06-20 ENCOUNTER — OUTPATIENT (OUTPATIENT)
Dept: OUTPATIENT SERVICES | Facility: HOSPITAL | Age: 58
LOS: 1 days | End: 2023-06-20
Payer: COMMERCIAL

## 2023-06-20 ENCOUNTER — TRANSCRIPTION ENCOUNTER (OUTPATIENT)
Age: 58
End: 2023-06-20

## 2023-06-20 VITALS
DIASTOLIC BLOOD PRESSURE: 75 MMHG | HEART RATE: 64 BPM | WEIGHT: 130.95 LBS | HEIGHT: 62 IN | SYSTOLIC BLOOD PRESSURE: 119 MMHG | TEMPERATURE: 98 F | OXYGEN SATURATION: 100 % | RESPIRATION RATE: 13 BRPM

## 2023-06-20 VITALS
DIASTOLIC BLOOD PRESSURE: 70 MMHG | RESPIRATION RATE: 13 BRPM | SYSTOLIC BLOOD PRESSURE: 121 MMHG | HEART RATE: 68 BPM | OXYGEN SATURATION: 97 %

## 2023-06-20 DIAGNOSIS — Z98.89 OTHER SPECIFIED POSTPROCEDURAL STATES: Chronic | ICD-10-CM

## 2023-06-20 DIAGNOSIS — S83.281D OTHER TEAR OF LATERAL MENISCUS, CURRENT INJURY, RIGHT KNEE, SUBSEQUENT ENCOUNTER: ICD-10-CM

## 2023-06-20 DIAGNOSIS — L05.91 PILONIDAL CYST WITHOUT ABSCESS: Chronic | ICD-10-CM

## 2023-06-20 DIAGNOSIS — Z98.890 OTHER SPECIFIED POSTPROCEDURAL STATES: Chronic | ICD-10-CM

## 2023-06-20 PROCEDURE — 29876 ARTHRS KNEE SURG SYNVCT MAJ: CPT | Mod: RT

## 2023-06-20 PROCEDURE — 97161 PT EVAL LOW COMPLEX 20 MIN: CPT

## 2023-06-20 PROCEDURE — 29880 ARTHRS KNE SRG MNISECTMY M&L: CPT | Mod: RT

## 2023-06-20 PROCEDURE — 29881 ARTHRS KNE SRG MNISECTMY M/L: CPT | Mod: RT

## 2023-06-20 RX ORDER — CHLORHEXIDINE GLUCONATE 213 G/1000ML
1 SOLUTION TOPICAL ONCE
Refills: 0 | Status: COMPLETED | OUTPATIENT
Start: 2023-06-20 | End: 2023-06-20

## 2023-06-20 RX ORDER — ROSUVASTATIN CALCIUM 5 MG/1
1 TABLET ORAL
Refills: 0 | DISCHARGE

## 2023-06-20 RX ORDER — APREPITANT 80 MG/1
40 CAPSULE ORAL ONCE
Refills: 0 | Status: COMPLETED | OUTPATIENT
Start: 2023-06-20 | End: 2023-06-20

## 2023-06-20 RX ORDER — HYDROMORPHONE HYDROCHLORIDE 2 MG/ML
1 INJECTION INTRAMUSCULAR; INTRAVENOUS; SUBCUTANEOUS
Refills: 0 | Status: DISCONTINUED | OUTPATIENT
Start: 2023-06-20 | End: 2023-06-20

## 2023-06-20 RX ORDER — CEFAZOLIN SODIUM 1 G
2000 VIAL (EA) INJECTION ONCE
Refills: 0 | Status: COMPLETED | OUTPATIENT
Start: 2023-06-20 | End: 2023-06-20

## 2023-06-20 RX ORDER — HYDROMORPHONE HYDROCHLORIDE 2 MG/ML
0.5 INJECTION INTRAMUSCULAR; INTRAVENOUS; SUBCUTANEOUS
Refills: 0 | Status: DISCONTINUED | OUTPATIENT
Start: 2023-06-20 | End: 2023-06-20

## 2023-06-20 RX ORDER — ONDANSETRON 8 MG/1
4 TABLET, FILM COATED ORAL ONCE
Refills: 0 | Status: DISCONTINUED | OUTPATIENT
Start: 2023-06-20 | End: 2023-06-20

## 2023-06-20 RX ORDER — SODIUM CHLORIDE 9 MG/ML
1000 INJECTION, SOLUTION INTRAVENOUS
Refills: 0 | Status: DISCONTINUED | OUTPATIENT
Start: 2023-06-20 | End: 2023-06-20

## 2023-06-20 RX ORDER — OXYCODONE AND ACETAMINOPHEN 5; 325 MG/1; MG/1
1 TABLET ORAL
Qty: 10 | Refills: 0
Start: 2023-06-20

## 2023-06-20 RX ORDER — OXYCODONE HYDROCHLORIDE 5 MG/1
5 TABLET ORAL ONCE
Refills: 0 | Status: DISCONTINUED | OUTPATIENT
Start: 2023-06-20 | End: 2023-06-20

## 2023-06-20 RX ADMIN — HYDROMORPHONE HYDROCHLORIDE 1 MILLIGRAM(S): 2 INJECTION INTRAMUSCULAR; INTRAVENOUS; SUBCUTANEOUS at 15:19

## 2023-06-20 RX ADMIN — SODIUM CHLORIDE 75 MILLILITER(S): 9 INJECTION, SOLUTION INTRAVENOUS at 15:19

## 2023-06-20 RX ADMIN — APREPITANT 40 MILLIGRAM(S): 80 CAPSULE ORAL at 11:45

## 2023-06-20 RX ADMIN — HYDROMORPHONE HYDROCHLORIDE 1 MILLIGRAM(S): 2 INJECTION INTRAMUSCULAR; INTRAVENOUS; SUBCUTANEOUS at 15:45

## 2023-06-20 RX ADMIN — CHLORHEXIDINE GLUCONATE 1 APPLICATION(S): 213 SOLUTION TOPICAL at 11:40

## 2023-06-20 NOTE — PHYSICAL THERAPY INITIAL EVALUATION ADULT - RANGE OF MOTION EXAMINATION, REHAB EVAL
R LE impaired secondary to surgery/bilateral upper extremity ROM was WFL (within functional limits)/Left LE ROM was WFL (within functional limits)/deficits as listed below

## 2023-06-20 NOTE — ASU DISCHARGE PLAN (ADULT/PEDIATRIC) - CARE PROVIDER_API CALL
Travis Morales)  Orthopaedic Surgery  [address] [facility]  [place] [address]  Phone: [phone]  Fax: [phone]  Follow Up Time:

## 2023-06-20 NOTE — ASU PREOP CHECKLIST - ALLERGIES REVIEWED
POST SEPSIS ALERT HUDDLE  Time Huddle Called Overhead: 1826    Responding Provider: Patric Whitaker CNP    ED RN: Barbi    SEVERE SEPSIS CRITERIA  Infection Source: No suspected infection at this time.     SIRS: , RR 24     Organ Dysfunction: lactate 2.9    Time Huddle Completed: 1836    Were 2 sets of blood cultures drawn prior to ABX: yes    Time of Blood Culture # 1: 1500  Time of Blood Culture # 2:  1515    IV Antibiotic Given: Azithromycin   Time IV Antibiotic Given: 1644    Initial Lactate: 2.9  Draw/Result Time: 1515/1600    Repeat Lactate: 3.4  Draw/Result Time: 1818/1903      INITIAL HYPOTENSION (second SBP <90 or MAP <65 within 3 hours of initial) or SHOCK CRITERIA (initial lactic acid > or = 4)  Actual Weight: n/a    BMI: n/a    IBW (only to be used if BMI >30 and documented by physician): n/a    IBW in kg charted in physician note: n/a    30 mL/kg: n/a    Volume of Fluid Completed: n/a    Time Fluids Completed:n/a    Documentation by Physician as to why fluids would be detrimental: n/a    Last 2 Consecutive BP/MAP within the hour after fluids completed: n/a   1.n/a   2. n/a    Vasopressor Start Time (must be started within 6 hours of time zero): n/a    Why Vasopressor not started: n/a    Time of Sepsis Reassessment Completed by Physician (must be within 6 hours of shock): n/a             done

## 2023-06-20 NOTE — ASU PREOP CHECKLIST - BOWEL PREP
Called and left a voicemail to call the clinic back, when she calls back please ask the questions below from Dr. Bustos.  Thank you.     Lazara Hamm CMA      n/a

## 2023-06-20 NOTE — ASU PATIENT PROFILE, ADULT - NSALCOHOLAMT_GEN_A_CORE_SD
Please contact patient and inquire what they are referring to.  The last visit I had with the patient was for referral to home health.  I placed no orders for images.  They maybe go to the ER?  Was it ordered by another provider?  May also need 2. Medical records possibly 1-2 drinks

## 2023-06-20 NOTE — PHYSICAL THERAPY INITIAL EVALUATION ADULT - ADDITIONAL COMMENTS
Pt resides in pvt home with spouse. Pt states 4STE +HR, 12+5 stairs +HR to second floor bedroom. Pt has cane. Pt reports was independent prior to admission with use of cane.

## 2023-06-20 NOTE — PHYSICAL THERAPY INITIAL EVALUATION ADULT - NSACTIVITYREC_GEN_A_PT
Pt independent in bed mobility, transfers, amb with cane; pt edu/instructed on stair negotiation simulation with cane. Pt prefers to use cane over crutches to amb with R LE WBAT. Pt  available to assist as needed. Pt cleared from PT services, no skilled PT needs upon d/c.

## 2023-06-26 ENCOUNTER — TRANSCRIPTION ENCOUNTER (OUTPATIENT)
Age: 58
End: 2023-06-26

## 2023-06-26 LAB
CYTOLOGY CVX/VAG DOC THIN PREP: ABNORMAL
HPV HIGH+LOW RISK DNA PNL CVX: NOT DETECTED

## 2023-06-28 ENCOUNTER — APPOINTMENT (OUTPATIENT)
Dept: ORTHOPEDIC SURGERY | Facility: CLINIC | Age: 58
End: 2023-06-28
Payer: COMMERCIAL

## 2023-06-28 DIAGNOSIS — S83.281D OTHER TEAR OF LATERAL MENISCUS, CURRENT INJURY, RIGHT KNEE, SUBSEQUENT ENCOUNTER: ICD-10-CM

## 2023-06-28 PROCEDURE — 99024 POSTOP FOLLOW-UP VISIT: CPT

## 2023-06-28 NOTE — HISTORY OF PRESENT ILLNESS
[de-identified] : 57 year old female presents for 1st post-op visit s/p right knee arthroscopy 06/20/23. She is doing well and recovering at home. She notes mild discomfort but notes that her motion is quite restricted. She states that she has been doing the home exercises but hasn't been forcing her knee into flexion or extension. She denies buckling. She is walking with the cane in the left hand. She ices intermittently.   \par PMHx of HLD, osteoporosis (now osteopenia). S/P right knee arthroscopy x2, last 8 years ago with Dr. Morales. \par \par MRI Right Knee, R, 05-:\par 1.  Complete acute tear of the proximal attachment of the ACL. Lateral compartment malalignment, compatible with ACL insufficiency.\par 2.  Displaced bucket-handle tear of the lateral meniscus.\par 3.  Displaced avulsion of the posterior root of the medial meniscus.\par 4.  Femorotibial osseous injuries in a pattern consistent with an ACL tear mechanism. Injuries include a small mildly impacted fracture of the posterior medial tibial plateau, and otherwise consists of osseous contusions.\par 5.  Tricompartmental osteoarthritis, with moderate grade articular cartilage thinning within the medial compartment. Full-thickness fissuring of the patellofemoral compartment articular cartilage.\par 6.  Focal subchondral marrow edema within the extreme posterior superior third of lateral femoral condyle compatible with osteoarthritis versus a contusion.\par 7.  Moderate-sized hemarthrosis.\par 8.  Evidence of a low-grade posterolateral corner injury.\par 9.  Critical findings notification system activated.\par \par X-rays done with Dr. Oswald on 04/24/2023:\par o Right Knee : Standing AP, lateral, and skyline views were obtained and revealed moderate to severe medial and mild patellofemoral arthritis, no lytic lesions or fracture visible.

## 2023-06-28 NOTE — PHYSICAL EXAM
[de-identified] : Constitutional\par o Appearance : well-nourished, well developed, alert, in no acute distress \par Head and Face\par o Head :\par ¦ Inspection : atraumatic, normocephalic\par o Face :\par ¦ Inspection : no visible rash or discoloration\par Respiratory\par o Respiratory Effort: breathing unlabored \par Neurologic\par o Mental Status Examination :\par ¦ Orientation : alert and oriented X 3\par Psychiatric\par o Mood and Affect: mood normal, affect appropriate\par Cardiovascular\par o Observation/Palpation : - no swelling\par Lymphatic\par o Additional Nodes : No palpable lymph nodes present\par \par Right Lower Extremity\par o Buttock : no tenderness, swelling or deformities \par o Right Hip :\par ¦ Inspection/Palpation : no tenderness, swelling or deformities\par ¦ Range of Motion : full and painless in all planes, no crepitance\par ¦ Stability : joint stability intact\par ¦ Strength : extension, flexion, adduction, abduction, internal rotation and external rotation, 5/5\par \par o Right Knee :\par ¦ Inspection/Palpation : sutures removes, Benzoin and steri-strips applied,  mild swelling, well-healed incisions\par ¦ Range of Motion : ° , decreased patellofemoral glide \par ¦ Stability : no valgus or varus instability present on provocative testing\par ¦ Strength : flexion and extension 4+/5\par ¦ Tests and Signs : negative Anterior Drawer, negative Lachman, negative Margareth\par \par Left Lower Extremity\par o Buttock : no tenderness, swelling or deformities \par o Left Hip :\par ¦ Inspection/Palpation : no tenderness, no swelling or deformities\par ¦ Range of Motion : full and painless in all planes, no crepitance\par ¦ Stability : joint stability intact\par ¦ Strength : extension, flexion, adduction, abduction, internal rotation and external rotation, 5/5\par \par o Left Knee :\par ¦ Inspection/Palpation : no tenderness to palpation, no swelling\par ¦ Range of Motion : goes into slight recurvatum, flexion to 140° painless, no crepitance\par ¦ Stability : no valgus or varus instability present on provocative testing\par ¦ Strength : flexion and extension 5/5\par ¦ Tests and Signs : negative Anterior Drawer, negative Lachman, negative Margareth\par \par Gait and Station:\par Gait: ambulating on a stiff, bent knee with a cane, no significant extremity swelling or lymphedema, good proprioception and balance Surgeon/Pathologist Verbiage (Will Incorporate Name Of Surgeon From Intro If Not Blank): operated in two distinct and integrated capacities as the surgeon and pathologist.

## 2023-06-28 NOTE — DISCUSSION/SUMMARY
[de-identified] : I went over the pathophysiology of the patient's symptoms and surgery in great detail with the patient and her .  She was instructed on ice and NSAIDs as needed. She was encouraged to continue with ROM exercises of her right knee, which includes working through the pain in order to improve her motion. We discussed the importance of her obtaining full range of motion, as well as our concern that her motion is quite restricted despite having the surgery. A prescription was given for PT. All of their questions were answered. They understand and consent to the plan. \par \par FU 3 weeks after starting course of PT.\par If at that time, her motion has not improved, we will consider obtaining a repeat MRI.

## 2023-07-19 ENCOUNTER — APPOINTMENT (OUTPATIENT)
Dept: ORTHOPEDIC SURGERY | Facility: CLINIC | Age: 58
End: 2023-07-19
Payer: COMMERCIAL

## 2023-07-19 VITALS — HEIGHT: 62 IN | WEIGHT: 130 LBS | BODY MASS INDEX: 23.92 KG/M2

## 2023-07-19 PROCEDURE — 99024 POSTOP FOLLOW-UP VISIT: CPT

## 2023-07-19 NOTE — DISCUSSION/SUMMARY
[de-identified] : I went over the pathophysiology of the patient's symptoms in great detail with the patient. At-home strengthening exercises were discussed and demonstrated with the patient. Viscosupplementation was discussed as a solution to the patient's symptoms and a booklet with information was provided. I am recommending the patient continues to go to physical therapy to obtain increases in their strength and mobility. A prescription was provided. \par \par All of their questions were answered. They understand and consent to the plan. \par \par FU in 6 weeks.

## 2023-07-19 NOTE — HISTORY OF PRESENT ILLNESS
[de-identified] : The patient is a 58 year old female who returns for the 2nd  postoperative visit after undergoing a right knee TKR on 06/20/2023. The patient is recovering at home. She reports mild postoperative pain. She has been attending PT and doing at home exercises. She reports she is doing well. \par PMHx of HLD, osteoporosis (now osteopenia). S/P right knee arthroscopy x2, last 8 years ago with Dr. Morales. \par \par X-rays done with Dr. Oswald on 04/24/2023:\par o Right Knee : Standing AP, lateral, and skyline views were obtained and revealed moderate to severe medial and mild patellofemoral arthritis, no lytic lesions or fracture visible.

## 2023-07-19 NOTE — ADDENDUM
[FreeTextEntry1] : I, Ketan Redd, acted solely as a scribe for Dr. Travis Morales on this date 05/15/2023.\par \par All medical record entries made by the Scribe were at my, Dr. Travis Morales, direction and personally dictated by me on 05/15/2023. I have reviewed the chart and agree that the record accurately reflects my personal performance of the history, physical exam, assessment and plan. I have also personally directed, reviewed, and agreed with the chart.

## 2023-07-19 NOTE — CONSULT LETTER
[Dear  ___] : Dear  [unfilled], [Consult Letter:] : I had the pleasure of evaluating your patient, [unfilled]. [Please see my note below.] : Please see my note below. [Consult Closing:] : Thank you very much for allowing me to participate in the care of this patient.  If you have any questions, please do not hesitate to contact me. [Sincerely,] : Sincerely, [FreeTextEntry3] : Travis Morales M.D.

## 2023-07-19 NOTE — PHYSICAL EXAM
[de-identified] : Constitutional\par o Appearance : well-nourished, well developed, alert, in no acute distress \par Head and Face\par o Head :\par ¦ Inspection : atraumatic, normocephalic\par o Face :\par ¦ Inspection : no visible rash or discoloration\par Respiratory\par o Respiratory Effort: breathing unlabored \par Neurologic\par o Mental Status Examination :\par ¦ Orientation : alert and oriented X 3\par Psychiatric\par o Mood and Affect: mood normal, affect appropriate\par Cardiovascular\par o Observation/Palpation : - no swelling\par Lymphatic\par o Additional Nodes : No palpable lymph nodes present\par \par Right Lower Extremity\par o Buttock : no tenderness, swelling or deformities \par o Right Hip :\par ¦ Inspection/Palpation : no tenderness, swelling or deformities\par ¦ Range of Motion : full and painless in all planes, no crepitance\par ¦ Stability : joint stability intact\par ¦ Strength : extension, flexion, adduction, abduction, internal rotation and external rotation, 5/5\par \par o Right Knee :\par ¦ Inspection/Palpation : no tenderness to palpation, minimal swelling, well-healed arthroscopic portals , lacks full extension \par ¦ Range of Motion : 5-120° , decreased patellofemoral glide \par ¦ Stability : no valgus or varus instability present on provocative testing\par ¦ Strength : flexion and extension 5/5\par ¦ Tests and Signs : negative Anterior Drawer, negative Lachman, negative Margareth\par \par Left Lower Extremity\par o Buttock : no tenderness, swelling or deformities \par o Left Hip :\par ¦ Inspection/Palpation : no tenderness, no swelling or deformities\par ¦ Range of Motion : full and painless in all planes, no crepitance\par ¦ Stability : joint stability intact\par ¦ Strength : extension, flexion, adduction, abduction, internal rotation and external rotation, 5/5\par \par o Left Knee :\par ¦ Inspection/Palpation : no tenderness to palpation, no swelling\par ¦ Range of Motion : 0-145 goes into slight recurvatum, flexion to 140° painless, no crepitance\par ¦ Stability : no valgus or varus instability present on provocative testing\par ¦ Strength : flexion and extension 5/5\par ¦ Tests and Signs : negative Anterior Drawer, negative Lachman, negative Margareth\par \par Gait and Station:\par Gait: heel/toe on the right , no significant extremity swelling or lymphedema, good proprioception and balance

## 2023-08-10 ENCOUNTER — APPOINTMENT (OUTPATIENT)
Dept: ULTRASOUND IMAGING | Facility: CLINIC | Age: 58
End: 2023-08-10
Payer: COMMERCIAL

## 2023-08-10 ENCOUNTER — APPOINTMENT (OUTPATIENT)
Dept: MAMMOGRAPHY | Facility: CLINIC | Age: 58
End: 2023-08-10
Payer: COMMERCIAL

## 2023-08-10 ENCOUNTER — RESULT REVIEW (OUTPATIENT)
Age: 58
End: 2023-08-10

## 2023-08-10 PROCEDURE — 76641 ULTRASOUND BREAST COMPLETE: CPT | Mod: 50

## 2023-08-10 PROCEDURE — 77063 BREAST TOMOSYNTHESIS BI: CPT

## 2023-08-10 PROCEDURE — 77067 SCR MAMMO BI INCL CAD: CPT

## 2023-08-21 ENCOUNTER — NON-APPOINTMENT (OUTPATIENT)
Age: 58
End: 2023-08-21

## 2023-08-21 ENCOUNTER — LABORATORY RESULT (OUTPATIENT)
Age: 58
End: 2023-08-21

## 2023-08-21 ENCOUNTER — APPOINTMENT (OUTPATIENT)
Dept: PULMONOLOGY | Facility: CLINIC | Age: 58
End: 2023-08-21
Payer: COMMERCIAL

## 2023-08-21 VITALS — HEART RATE: 58 BPM | DIASTOLIC BLOOD PRESSURE: 78 MMHG | OXYGEN SATURATION: 100 % | SYSTOLIC BLOOD PRESSURE: 125 MMHG

## 2023-08-21 LAB
ALBUMIN SERPL ELPH-MCNC: 4.7 G/DL
ALP BLD-CCNC: 69 U/L
ALT SERPL-CCNC: 17 U/L
ANION GAP SERPL CALC-SCNC: 11 MMOL/L
AST SERPL-CCNC: 21 U/L
BILIRUB SERPL-MCNC: 0.4 MG/DL
BUN SERPL-MCNC: 11 MG/DL
CALCIUM SERPL-MCNC: 9.7 MG/DL
CHLORIDE SERPL-SCNC: 104 MMOL/L
CHOLEST SERPL-MCNC: 174 MG/DL
CO2 SERPL-SCNC: 27 MMOL/L
CREAT SERPL-MCNC: 0.71 MG/DL
EGFR: 97 ML/MIN/1.73M2
GLUCOSE SERPL-MCNC: 91 MG/DL
HDLC SERPL-MCNC: 81 MG/DL
LDLC SERPL CALC-MCNC: 82 MG/DL
NONHDLC SERPL-MCNC: 93 MG/DL
POTASSIUM SERPL-SCNC: 4.4 MMOL/L
PROT SERPL-MCNC: 6.7 G/DL
SODIUM SERPL-SCNC: 141 MMOL/L
TRIGL SERPL-MCNC: 56 MG/DL

## 2023-08-21 PROCEDURE — 99214 OFFICE O/P EST MOD 30 MIN: CPT

## 2023-08-21 NOTE — PHYSICAL EXAM
[General Appearance - Well Developed] : well developed [Normal Appearance] : normal appearance [Well Groomed] : well groomed [General Appearance - Well Nourished] : well nourished [No Deformities] : no deformities [General Appearance - In No Acute Distress] : no acute distress [Normal Conjunctiva] : the conjunctiva exhibited no abnormalities [Eyelids - No Xanthelasma] : the eyelids demonstrated no xanthelasmas [FreeTextEntry1] : Nonicteric [Normal Oropharynx] : normal oropharynx [I] : I [Neck Appearance] : the appearance of the neck was normal [Neck Cervical Mass (___cm)] : no neck mass was observed [Jugular Venous Distention Increased] : there was no jugular-venous distention [Thyroid Diffuse Enlargement] : the thyroid was not enlarged [Thyroid Nodule] : there were no palpable thyroid nodules [Heart Rate And Rhythm] : heart rate and rhythm were normal [Heart Sounds] : normal S1 and S2 [Murmurs] : no murmurs present [Arterial Pulses Normal] : the arterial pulses were normal [Respiration, Rhythm And Depth] : normal respiratory rhythm and effort [Exaggerated Use Of Accessory Muscles For Inspiration] : no accessory muscle use [Auscultation Breath Sounds / Voice Sounds] : lungs were clear to auscultation bilaterally [Chest Palpation] : palpation of the chest revealed no abnormalities [Lungs Percussion] : the lungs were normal to percussion [Bowel Sounds] : normal bowel sounds [Abdomen Soft] : soft [Abdomen Tenderness] : non-tender [Abdomen Mass (___ Cm)] : no abdominal mass palpated [Abnormal Walk] : normal gait [Gait - Sufficient For Exercise Testing] : the gait was sufficient for exercise testing [Nail Clubbing] : no clubbing of the fingernails [Cyanosis, Localized] : no localized cyanosis [Petechial Hemorrhages (___cm)] : no petechial hemorrhages [] : no ischemic changes [Deep Tendon Reflexes (DTR)] : deep tendon reflexes were 2+ and symmetric [Sensation] : the sensory exam was normal to light touch and pinprick [No Focal Deficits] : no focal deficits [Oriented To Time, Place, And Person] : oriented to person, place, and time [Impaired Insight] : insight and judgment were intact [Affect] : the affect was normal [Mood] : the mood was normal

## 2023-08-21 NOTE — CONSULT LETTER
[Dear  ___] : Dear  [unfilled], [Courtesy Letter:] : I had the pleasure of seeing your patient, [unfilled], in my office today. [Please see my note below.] : Please see my note below. [Consult Closing:] : Thank you very much for allowing me to participate in the care of this patient.  If you have any questions, please do not hesitate to contact me. [Sincerely,] : Sincerely, [FreeTextEntry3] : .sig [___] : [unfilled]

## 2023-08-21 NOTE — HISTORY OF PRESENT ILLNESS
[< 20 pack-years] : < 20 pack-years [TextBox_29] : Discontinued all smoking in her mid 20s [Former] : is a former smoker [___ Year Quit] : ~He/She~ quit smoking in [unfilled] [___ Pack Year History] : [unfilled] pack year history [None] : The patient is not currently on any medications [FreeTextEntry1] : R knee arthroscopy Tuesday 6/20/23 [facility] [place] still in Rehab   female well visit  Dec 2022 GYN up to date No active complaints at this time Past medical history sarcoidosis Diagnosis with with a skin biopsy dating back to 1985 Borderline hypercholesterolemia Hayfever Some arthritic changes Reported history of aortic insufficiency Mild anemia reported colonoscopy 2015 - told 10 yr f/u  mammogram  pt states up to date with GYN Past surgical history right knee surgery x2 Social history minimal tobacco smoker ages [age] less than 1 pack/day Alcohol social with wine Occupational history unremarkable  No history of toxic chemical inhalational exposures Postmenopausal Family history both mother and father living patient's questions whether father has diabetes Mother she states does not see doctors no medical diseases reported Anoscopy she states up-to-date 3 years ago was told to have one 10 years from initial colonoscopy Gynecology yearly up-to-date January including Pap smear mammogram  No prescription medications Patient patient does take multiple over-the-counter supplements Niacin 500 mg twice daily calcium 1200 mg daily iron 65 mg daily vitamin D 50 MCG's daily vitamin C 500 mg twice daily vitamin D 134 mg daily magnesium 25 250 mg daily B12 1000 mg orally fish oil 1000 mg beta-carotene 3000 mg of multivitamin  COVID PFIZER plus booster

## 2023-08-21 NOTE — PROCEDURE
[FreeTextEntry1] : Laboratory data Orthopedics preop CBC normal range WBC 7.73 hemoglobin 12.6 hematocrit 38.5 Platelet count 274,000 Serum electrolytes normal range Serum potassium 3.7 Serum calcium 9.2 Renal function normal Creatinine 0.81 with GFR 84  EKG June 12, 2023 Normal sinus rhythm  PFT 6/16/23 [place] nl flow  rates  Lung Volumes  nl   DLCO 88 % HGB 12.6  NIOx  10  ppb WNL 6/16/23   PFT December 30, 2022 Indication sarcoidosis Flow rates normal Lung volumes normal TLC 97% predicted Diffusion normal range 88% predicted Hemoglobin 13.4  EKG 12/30/2022 Heart rate 56 Sinus bradycardia Occasional ectopy Within normal limits MS QT intervals normal range  Bone density 12/30/2022 AP spine L1-L4 osteopenia Left femoral neck osteopenia Total left hip normal Compared to data of 12/21/2020 the AP spine L1-L4 demonstrates interval improvement of the T score and interval improvement of the total left hip Follow-up 2 years  Chest x-ray PA lateral 12/30/2022 Cardiac size normal Mild left lateral scoliosis Clear lung fields without parenchymal infiltrates pleural effusions dominant pulmonary nodules No pneumothorax Jen mediastinum unremarkable no appreciable mediastinal widening Impression clear lungs Mild scoliosis  EKG 12/16/21  NSR  X-ray PA lateral December 21, 2020 History sarcoidosis Normal cardiac size Left lateral rotary scoliosis Clear lungs No parenchymal infiltrates pleural effusions dominant pulmonary nodules Jen mediastinum unremarkable Interval change compared to a chest x-ray of December 20, 2019  EKG 12/21/20 NSR  DEXA 12/21/2020 AP Spine osteoporosis HIP Left Osteopenia  Bone density December 14, 2018 spine and hip consistent with osteopenia  PFT without bronchodilator 12/202019 Flow rates normal Lung volumes normal Diffusion normal 89 % predicted HGB  12.7  Blood  draw  EKG 12/20/2019 NSR atypical normal  variant

## 2023-08-21 NOTE — DISCUSSION/SUMMARY
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

## 2023-09-07 ENCOUNTER — APPOINTMENT (OUTPATIENT)
Dept: ORTHOPEDIC SURGERY | Facility: CLINIC | Age: 58
End: 2023-09-07
Payer: COMMERCIAL

## 2023-09-07 PROCEDURE — 99214 OFFICE O/P EST MOD 30 MIN: CPT | Mod: 24

## 2023-09-07 PROCEDURE — 99024 POSTOP FOLLOW-UP VISIT: CPT

## 2023-09-07 NOTE — HISTORY OF PRESENT ILLNESS
[de-identified] : The patient is a 58-year-old female who returns for the 3rd postoperative visit after undergoing a right knee TKR on 06/20/2023. The patient is recovering at home. She reports mild postoperative pain. She notes she is currently in physical therapy 2x weeks and would like to continue going. She notes she has been doing at-home exercises 2 hours a day. She presents with her . She notes she is going away.  PMHx of HLD, osteoporosis (now osteopenia). S/P right knee arthroscopy x2, last 8 years ago with Dr. Morales.   X-rays done with Dr. Oswald on 04/24/2023: o Right Knee : Standing AP, lateral, and skyline views were obtained and revealed moderate to severe medial and mild patellofemoral arthritis, no lytic lesions or fracture visible.

## 2023-09-07 NOTE — DISCUSSION/SUMMARY
[de-identified] : I went over the pathophysiology of the patient's symptoms in great detail with the patient. She needs to avoid high-impact activities such as running and jumping or riding a treadmill. I recommend alternative activities such as riding a stationary bike or elliptical on low tension. She should focus on light weight and high repetition exercises. She should avoid squatting and kneeling. At-home strengthening exercises were discussed and demonstrated with the patient. We discussed the use of ice, Tylenol and anti-inflammatories to relieve pain. I am recommending the patient continues to go to physical therapy to obtain increases in their strength and mobility. A prescription was provided. I instructed the patient on frequent icing, ankle pumping, leg lifts, tightening exercises, and ROM exercises.   All of their questions were answered. They understand and consent to the plan.   FU in 4-6 weeks, if her right knee is still swollen, we will consider a drainage and cortisone injection.

## 2023-09-07 NOTE — PHYSICAL EXAM
[de-identified] : Constitutional o Appearance : well-nourished, well developed, alert, in no acute distress  Head and Face o Head :  Inspection : atraumatic, normocephalic o Face :  Inspection : no visible rash or discoloration Respiratory o Respiratory Effort: breathing unlabored  Neurologic o Mental Status Examination :  Orientation : alert and oriented X 3 Psychiatric o Mood and Affect: mood normal, affect appropriate Cardiovascular o Observation/Palpation : - no swelling Lymphatic o Additional Nodes : No palpable lymph nodes present  Right Lower Extremity o Buttock : no tenderness, swelling or deformities  o Right Hip :  Inspection/Palpation : no tenderness, swelling or deformities  Range of Motion : full and painless in all planes, no crepitance  Stability : joint stability intact  Strength : extension, flexion, adduction, abduction, internal rotation and external rotation, 5/5  o Right Knee :  Inspection/Palpation : no tenderness to palpation, mild swelling, well-healed incisions , lacks full extension   Range of Motion : 0-125 , decreased patellofemoral glide   Stability : no valgus or varus instability present on provocative testing  Strength : flexion and extension 5/5  Tests and Signs : negative Anterior Drawer, negative Lachman, negative Margareth  Left Lower Extremity o Buttock : no tenderness, swelling or deformities  o Left Hip :  Inspection/Palpation : no tenderness, no swelling or deformities  Range of Motion : full and painless in all planes, no crepitance  Stability : joint stability intact  Strength : extension, flexion, adduction, abduction, internal rotation and external rotation, 5/5  o Left Knee :  Inspection/Palpation : no tenderness to palpation, no swelling  Range of Motion : 0-145 goes into slight recurvatum, flexion to 140 painless, no crepitance  Stability : no valgus or varus instability present on provocative testing  Strength : flexion and extension 5/5  Tests and Signs : negative Anterior Drawer, negative Lachman, negative Margareth  Gait and Station: Gait: heel/toe on the right , no significant extremity swelling or lymphedema, good proprioception and balance

## 2023-10-02 DIAGNOSIS — Z23 ENCOUNTER FOR IMMUNIZATION: ICD-10-CM

## 2023-10-11 ENCOUNTER — APPOINTMENT (OUTPATIENT)
Dept: ORTHOPEDIC SURGERY | Facility: CLINIC | Age: 58
End: 2023-10-11
Payer: COMMERCIAL

## 2023-10-11 PROCEDURE — 99213 OFFICE O/P EST LOW 20 MIN: CPT

## 2023-10-11 RX ORDER — HYALURONATE SODIUM, STABILIZED 88 MG/4 ML
88 SYRINGE (ML) INTRAARTICULAR
Qty: 1 | Refills: 0 | Status: ACTIVE | COMMUNITY
Start: 2023-10-11

## 2023-10-29 ENCOUNTER — RX RENEWAL (OUTPATIENT)
Age: 58
End: 2023-10-29

## 2023-10-30 ENCOUNTER — APPOINTMENT (OUTPATIENT)
Dept: ORTHOPEDIC SURGERY | Facility: CLINIC | Age: 58
End: 2023-10-30
Payer: COMMERCIAL

## 2023-10-30 PROCEDURE — 20611 DRAIN/INJ JOINT/BURSA W/US: CPT | Mod: RT

## 2023-10-30 PROCEDURE — 99214 OFFICE O/P EST MOD 30 MIN: CPT | Mod: 25

## 2023-10-30 PROCEDURE — 73564 X-RAY EXAM KNEE 4 OR MORE: CPT | Mod: LT

## 2023-10-30 RX ORDER — HYALURONATE SODIUM, STABILIZED 88 MG/4 ML
88 SYRINGE (ML) INTRAARTICULAR
Qty: 1 | Refills: 0 | Status: ACTIVE | COMMUNITY
Start: 2023-10-30

## 2023-11-03 ENCOUNTER — RX RENEWAL (OUTPATIENT)
Age: 58
End: 2023-11-03

## 2023-12-28 ENCOUNTER — NON-APPOINTMENT (OUTPATIENT)
Age: 58
End: 2023-12-28

## 2023-12-28 ENCOUNTER — APPOINTMENT (OUTPATIENT)
Dept: PULMONOLOGY | Facility: CLINIC | Age: 58
End: 2023-12-28
Payer: COMMERCIAL

## 2023-12-28 VITALS
BODY MASS INDEX: 24.29 KG/M2 | WEIGHT: 132 LBS | DIASTOLIC BLOOD PRESSURE: 79 MMHG | HEIGHT: 62 IN | HEART RATE: 60 BPM | SYSTOLIC BLOOD PRESSURE: 124 MMHG | OXYGEN SATURATION: 99 %

## 2023-12-28 DIAGNOSIS — Z00.00 ENCOUNTER FOR GENERAL ADULT MEDICAL EXAMINATION W/OUT ABNORMAL FINDINGS: ICD-10-CM

## 2023-12-28 DIAGNOSIS — Z11.59 ENCOUNTER FOR SCREENING FOR OTHER VIRAL DISEASES: ICD-10-CM

## 2023-12-28 DIAGNOSIS — I34.1 NONRHEUMATIC MITRAL (VALVE) PROLAPSE: ICD-10-CM

## 2023-12-28 LAB
ALBUMIN: 80
BASOPHILS # BLD AUTO: 0.08 K/UL
BASOPHILS NFR BLD AUTO: 1.4 %
BILIRUB UR QL STRIP: NORMAL
CLARITY UR: CLEAR
COLLECTION METHOD: NORMAL
CREATININE: 300
EOSINOPHIL # BLD AUTO: 0.19 K/UL
EOSINOPHIL NFR BLD AUTO: 3.2 %
GLUCOSE UR-MCNC: NORMAL
HCG UR QL: 0.2 EU/DL
HCT VFR BLD CALC: 38.8 %
HGB BLD-MCNC: 12.5 G/DL
HGB UR QL STRIP.AUTO: NORMAL
IMM GRANULOCYTES NFR BLD AUTO: 0.2 %
KETONES UR-MCNC: NORMAL
LEUKOCYTE ESTERASE UR QL STRIP: NORMAL
LYMPHOCYTES # BLD AUTO: 2.55 K/UL
LYMPHOCYTES NFR BLD AUTO: 43.4 %
MAN DIFF?: NORMAL
MCHC RBC-ENTMCNC: 29.6 PG
MCHC RBC-ENTMCNC: 32.2 GM/DL
MCV RBC AUTO: 91.9 FL
MICROALBUMIN/CREAT UR TEST STR-RTO: ABNORMAL
MONOCYTES # BLD AUTO: 0.44 K/UL
MONOCYTES NFR BLD AUTO: 7.5 %
NEUTROPHILS # BLD AUTO: 2.6 K/UL
NEUTROPHILS NFR BLD AUTO: 44.3 %
NITRITE UR QL STRIP: NORMAL
PH UR STRIP: 6.5
PLATELET # BLD AUTO: 247 K/UL
PROT UR STRIP-MCNC: NORMAL
RBC # BLD: 4.22 M/UL
RBC # FLD: 13.3 %
SP GR UR STRIP: 1.02
WBC # FLD AUTO: 5.87 K/UL

## 2023-12-28 PROCEDURE — 94010 BREATHING CAPACITY TEST: CPT

## 2023-12-28 PROCEDURE — 36415 COLL VENOUS BLD VENIPUNCTURE: CPT

## 2023-12-28 PROCEDURE — 81003 URINALYSIS AUTO W/O SCOPE: CPT | Mod: QW

## 2023-12-28 PROCEDURE — 82044 UR ALBUMIN SEMIQUANTITATIVE: CPT | Mod: NC,QW

## 2023-12-28 PROCEDURE — 93000 ELECTROCARDIOGRAM COMPLETE: CPT

## 2023-12-28 PROCEDURE — 99396 PREV VISIT EST AGE 40-64: CPT | Mod: 25

## 2023-12-28 PROCEDURE — 71046 X-RAY EXAM CHEST 2 VIEWS: CPT

## 2023-12-28 NOTE — HISTORY OF PRESENT ILLNESS
[< 20 pack-years] : < 20 pack-years [Former] : is a former smoker [___ Year Quit] : ~He/She~ quit smoking in [unfilled] [___ Pack Year History] : [unfilled] pack year history [None] : The patient is not currently on any medications [TextBox_29] : Discontinued all smoking in her mid 20s [FreeTextEntry1] : CPE 12/28/23 s/p Bone Density 12 22 osteopenia Rheum f/u per patient significant improvement  R knee arthroscopy Tuesday 6/20/23  [facility] [place]  GYN up to date No active complaints at this time Past medical history sarcoidosis Diagnosis with with a skin biopsy dating back to 1985 Borderline hypercholesterolemia Hayfever Some arthritic changes Reported history of aortic insufficiency Mild anemia reported colonoscopy 2015 - told 10 yr f/u  mammogram  pt states up to date with GYN Past surgical history right knee surgery x2 Social history minimal tobacco smoker ages [age] less than 1 pack/day Alcohol social with wine Occupational history unremarkable  No history of toxic chemical inhalational exposures Postmenopausal Family history both mother and father living patient's questions whether father has diabetes Mother she states does not see doctors no medical diseases reported Anoscopy she states up-to-date 3 years ago was told to have one 10 years from initial colonoscopy Gynecology yearly up-to-date January including Pap smear mammogram  No prescription medications Patient patient does take multiple over-the-counter supplements Niacin 500 mg twice daily calcium 1200 mg daily iron 65 mg daily vitamin D 50 MCG's daily vitamin C 500 mg twice daily vitamin D 134 mg daily magnesium 25 250 mg daily B12 1000 mg orally fish oil 1000 mg beta-carotene 3000 mg of multivitamin  COVID PFIZER plus booster

## 2023-12-28 NOTE — PHYSICAL EXAM
[General Appearance - Well Developed] : well developed [Normal Appearance] : normal appearance [Well Groomed] : well groomed [General Appearance - Well Nourished] : well nourished [No Deformities] : no deformities [General Appearance - In No Acute Distress] : no acute distress [Normal Conjunctiva] : the conjunctiva exhibited no abnormalities [Eyelids - No Xanthelasma] : the eyelids demonstrated no xanthelasmas [Normal Oropharynx] : normal oropharynx [I] : I [Neck Appearance] : the appearance of the neck was normal [Neck Cervical Mass (___cm)] : no neck mass was observed [Jugular Venous Distention Increased] : there was no jugular-venous distention [Thyroid Diffuse Enlargement] : the thyroid was not enlarged [Thyroid Nodule] : there were no palpable thyroid nodules [Heart Rate And Rhythm] : heart rate and rhythm were normal [Heart Sounds] : normal S1 and S2 [Murmurs] : no murmurs present [Arterial Pulses Normal] : the arterial pulses were normal [Respiration, Rhythm And Depth] : normal respiratory rhythm and effort [Exaggerated Use Of Accessory Muscles For Inspiration] : no accessory muscle use [Auscultation Breath Sounds / Voice Sounds] : lungs were clear to auscultation bilaterally [Chest Palpation] : palpation of the chest revealed no abnormalities [Lungs Percussion] : the lungs were normal to percussion [Bowel Sounds] : normal bowel sounds [Abdomen Soft] : soft [Abdomen Tenderness] : non-tender [Abdomen Mass (___ Cm)] : no abdominal mass palpated [Abnormal Walk] : normal gait [Gait - Sufficient For Exercise Testing] : the gait was sufficient for exercise testing [Nail Clubbing] : no clubbing of the fingernails [Cyanosis, Localized] : no localized cyanosis [] : no ischemic changes [Petechial Hemorrhages (___cm)] : no petechial hemorrhages [Deep Tendon Reflexes (DTR)] : deep tendon reflexes were 2+ and symmetric [Sensation] : the sensory exam was normal to light touch and pinprick [No Focal Deficits] : no focal deficits [Oriented To Time, Place, And Person] : oriented to person, place, and time [Impaired Insight] : insight and judgment were intact [Mood] : the mood was normal [Affect] : the affect was normal [FreeTextEntry1] : Nonicteric

## 2023-12-28 NOTE — DISCUSSION/SUMMARY
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

## 2023-12-28 NOTE — PROCEDURE
[FreeTextEntry1] : EKG 12/28/23  sinus  bradycardia  [place] No BD 12/28/23  Nl flow  rates  Chest x-ray PA lateral December 28, 2023 Well visit Sarcoidosis Cardiac size normal Lung fields are clear No parenchymal infiltrates pleural effusions dominant pulmonary nodules Positive scoliosis No appreciable adenopathy in the chest   Laboratory data Orthopedics preop CBC normal range WBC 7.73 hemoglobin 12.6 hematocrit 38.5 Platelet count 274,000 Serum electrolytes normal range Serum potassium 3.7 Serum calcium 9.2 Renal function normal Creatinine 0.81 with GFR 84  EKG June 12, 2023 Normal sinus rhythm  PFT 6/16/23 [place] nl flow  rates  Lung Volumes  nl   DLCO 88 % HGB 12.6  NIOx  10  ppb WNL 6/16/23   PFT December 30, 2022 Indication sarcoidosis Flow rates normal Lung volumes normal TLC 97% predicted Diffusion normal range 88% predicted Hemoglobin 13.4  EKG 12/30/2022 Heart rate 56 Sinus bradycardia Occasional ectopy Within normal limits HI QT intervals normal range  Bone density 12/30/2022 AP spine L1-L4 osteopenia Left femoral neck osteopenia Total left hip normal Compared to data of 12/21/2020 the AP spine L1-L4 demonstrates interval improvement of the T score and interval improvement of the total left hip Follow-up 2 years  Chest x-ray PA lateral 12/30/2022 Cardiac size normal Mild left lateral scoliosis Clear lung fields without parenchymal infiltrates pleural effusions dominant pulmonary nodules No pneumothorax Jen mediastinum unremarkable no appreciable mediastinal widening Impression clear lungs Mild scoliosis  EKG 12/16/21  NSR  X-ray PA lateral December 21, 2020 History sarcoidosis Normal cardiac size Left lateral rotary scoliosis Clear lungs No parenchymal infiltrates pleural effusions dominant pulmonary nodules Jen mediastinum unremarkable Interval change compared to a chest x-ray of December 20, 2019  EKG 12/21/20 NSR  DEXA 12/21/2020 AP Spine osteoporosis HIP Left Osteopenia  Bone density December 14, 2018 spine and hip consistent with osteopenia  PFT without bronchodilator 12/202019 Flow rates normal Lung volumes normal Diffusion normal 89 % predicted HGB  12.7  Blood  draw  EKG 12/20/2019 NSR atypical normal  variant

## 2023-12-29 LAB
ALBUMIN SERPL ELPH-MCNC: 4.6 G/DL
ALP BLD-CCNC: 72 U/L
ALT SERPL-CCNC: 20 U/L
ANION GAP SERPL CALC-SCNC: 12 MMOL/L
AST SERPL-CCNC: 25 U/L
BILIRUB DIRECT SERPL-MCNC: 0.1 MG/DL
BILIRUB INDIRECT SERPL-MCNC: 0.2 MG/DL
BILIRUB SERPL-MCNC: 0.3 MG/DL
BUN SERPL-MCNC: 12 MG/DL
CALCIUM SERPL-MCNC: 9.6 MG/DL
CHLORIDE SERPL-SCNC: 105 MMOL/L
CHOLEST SERPL-MCNC: 185 MG/DL
CO2 SERPL-SCNC: 24 MMOL/L
COVID-19 NUCLEOCAPSID  GAM ANTIBODY INTERPRETATION: NEGATIVE
CREAT SERPL-MCNC: 0.75 MG/DL
EGFR: 91 ML/MIN/1.73M2
ESTIMATED AVERAGE GLUCOSE: 108 MG/DL
GLUCOSE SERPL-MCNC: 92 MG/DL
HBA1C MFR BLD HPLC: 5.4 %
HCV AB SER QL: NONREACTIVE
HCV S/CO RATIO: 0.1 S/CO
HDLC SERPL-MCNC: 77 MG/DL
LDLC SERPL CALC-MCNC: 94 MG/DL
NONHDLC SERPL-MCNC: 107 MG/DL
POTASSIUM SERPL-SCNC: 4.6 MMOL/L
PROT SERPL-MCNC: 6.4 G/DL
SARS-COV-2 AB SERPL QL IA: 0.09 INDEX
SODIUM SERPL-SCNC: 141 MMOL/L
T4 SERPL-MCNC: 8.3 UG/DL
TRIGL SERPL-MCNC: 69 MG/DL
TSH SERPL-ACNC: 2.53 UIU/ML

## 2023-12-30 LAB — BACTERIA UR CULT: NORMAL

## 2024-01-16 ENCOUNTER — APPOINTMENT (OUTPATIENT)
Dept: ORTHOPEDIC SURGERY | Facility: CLINIC | Age: 59
End: 2024-01-16
Payer: COMMERCIAL

## 2024-01-16 PROCEDURE — 99213 OFFICE O/P EST LOW 20 MIN: CPT | Mod: 25

## 2024-01-16 PROCEDURE — 20611 DRAIN/INJ JOINT/BURSA W/US: CPT | Mod: LT

## 2024-01-16 NOTE — HISTORY OF PRESENT ILLNESS
[de-identified] : The patient is a 58-year-old female who presents for a left knee Durolane injection today 1/16/2024. She denies any swelling or buckling. She had a right knee Durolane injection on 10/30/2023. She states she is still having right knee pain. She states she walks and does no impact exercises. She denies using weights.  She goes into recurvatum She denies any swelling or buckling. She is s/p right knee arthroscopy on 06/20/2023.  PMHx of HLD, osteoporosis (now osteopenia). S/P right knee arthroscopy x2, last 8 years ago with Dr. Morales.   Radiology Results  o Left Knee : Standing AP, lateral and tunnel views of the knee were obtained and revealed moderate tricompartmental osteoarthritis worse in the lateral compartment  X-rays done with Dr. Oswald on 04/24/2023: o Right Knee : Standing AP, lateral, and skyline views were obtained and revealed moderate to severe medial and mild patellofemoral arthritis, no lytic lesions or fracture visible.

## 2024-01-16 NOTE — PHYSICAL EXAM
[de-identified] : Constitutional o Appearance : well-nourished, well developed, alert, in no acute distress  Head and Face o Head :  Inspection : atraumatic, normocephalic o Face :  Inspection : no visible rash or discoloration Respiratory o Respiratory Effort: breathing unlabored  Neurologic o Mental Status Examination :  Orientation : alert and oriented X 3 Psychiatric o Mood and Affect: mood normal, affect appropriate Cardiovascular o Observation/Palpation : - no swelling Lymphatic o Additional Nodes : No palpable lymph nodes present  Right Lower Extremity o Buttock : no tenderness, swelling or deformities  o Right Hip :  Inspection/Palpation : no tenderness, swelling or deformities  Range of Motion : full and painless in all planes, no crepitance  Stability : joint stability intact  Strength : extension, flexion, adduction, abduction, internal rotation and external rotation, 5/5  o Right Knee :  Inspection/Palpation : no tenderness to palpation, no swelling, well-healed incisions , lacks full extension, small bakers cyst   Range of Motion : 0-145, decreased patellofemoral glide   Stability : no valgus or varus instability present on provocative testing  Strength : flexion and extension 5/5  Tests and Signs : negative Anterior Drawer, negative Lachman, negative Margareth  Left Lower Extremity o Buttock : no tenderness, swelling or deformities  o Left Hip :  Inspection/Palpation : no tenderness, no swelling or deformities  Range of Motion : full and painless in all planes, no crepitance  Stability : joint stability intact  Strength : extension, flexion, adduction, abduction, internal rotation and external rotation, 5/5  o Left Knee :  Inspection/Palpation : no tenderness to palpation, no swelling, small bakers cyst   Range of Motion : 3-140 goes into slight recurvatum, flexion to 140 painless, no crepitance  Stability : no valgus or varus instability present on provocative testing  Strength : flexion and extension 5/5  Tests and Signs : negative Anterior Drawer, negative Lachman, negative Margareth  Gait and Station: Gait: heel/toe on the right , no significant extremity swelling or lymphedema, good proprioception and balance  o Knee injection : Indication- left knee osteoarthritis, Anatomic location- left intra-articular joint space, Spray - area was sterilized with Betadine and alcohol and anesthetized with Ethyl Chloride , needle used-20G, Medications given- 3cc's Durolane under Ultrasound guidance. The patient tolerated the procedure well. Lot#[id number] Exp#[id number]

## 2024-01-16 NOTE — DISCUSSION/SUMMARY
[de-identified] : I went over the pathophysiology of the patient's symptoms in great detail with the patient. The patient elected to receive a Durolane injection into her  left knee today, and tolerated it well. I instructed the patient on ROM exercises, and told them to take it easy. The use of ice and rest was reviewed with the patient. The patient may resume activities tomorrow. I reminded the patient that it takes 4 to 6 weeks after the final injection to feel symptom relief.   All of their questions were answered. They understand and consent to the plan.   FU in 6 weeks.

## 2024-01-16 NOTE — ADDENDUM
[FreeTextEntry1] : I, TINO AGUILERA, acted solely as a scribe for Dr. Travis Morales on this date 01/16/2024.  All medical record entries made by the Scribe were at my, Dr. Travis Morales, direction and personally dictated by me on 01/16/2024. I have reviewed the chart and agree that the record accurately reflects my personal performance of the history, physical exam, assessment and plan. I have also personally directed, reviewed, and agreed with the chart.

## 2024-01-28 ENCOUNTER — RX RENEWAL (OUTPATIENT)
Age: 59
End: 2024-01-28

## 2024-03-29 ENCOUNTER — APPOINTMENT (OUTPATIENT)
Dept: PULMONOLOGY | Facility: CLINIC | Age: 59
End: 2024-03-29
Payer: COMMERCIAL

## 2024-03-29 ENCOUNTER — NON-APPOINTMENT (OUTPATIENT)
Age: 59
End: 2024-03-29

## 2024-03-29 VITALS — SYSTOLIC BLOOD PRESSURE: 114 MMHG | OXYGEN SATURATION: 100 % | HEART RATE: 60 BPM | DIASTOLIC BLOOD PRESSURE: 70 MMHG

## 2024-03-29 DIAGNOSIS — R80.9 PROTEINURIA, UNSPECIFIED: ICD-10-CM

## 2024-03-29 DIAGNOSIS — E78.5 HYPERLIPIDEMIA, UNSPECIFIED: ICD-10-CM

## 2024-03-29 LAB
ALBUMIN: 10
BILIRUB UR QL STRIP: NEGATIVE
CHOLEST SERPL-MCNC: 171 MG/DL
CLARITY UR: CLEAR
COLLECTION METHOD: NORMAL
CREATININE: 10
GLUCOSE UR-MCNC: NEGATIVE
HCG UR QL: 0.2 EU/DL
HDLC SERPL-MCNC: 76 MG/DL
HGB UR QL STRIP.AUTO: NEGATIVE
KETONES UR-MCNC: NEGATIVE
LDLC SERPL CALC-MCNC: 87 MG/DL
LEUKOCYTE ESTERASE UR QL STRIP: NEGATIVE
MICROALBUMIN/CREAT UR TEST STR-RTO: <30
NITRITE UR QL STRIP: NEGATIVE
NONHDLC SERPL-MCNC: 95 MG/DL
PH UR STRIP: 7
PROT UR STRIP-MCNC: NEGATIVE
SP GR UR STRIP: 1.01
TRIGL SERPL-MCNC: 41 MG/DL

## 2024-03-29 PROCEDURE — 36415 COLL VENOUS BLD VENIPUNCTURE: CPT

## 2024-03-29 PROCEDURE — 99213 OFFICE O/P EST LOW 20 MIN: CPT

## 2024-03-29 PROCEDURE — 81003 URINALYSIS AUTO W/O SCOPE: CPT | Mod: QW

## 2024-03-29 PROCEDURE — 82044 UR ALBUMIN SEMIQUANTITATIVE: CPT | Mod: NC,QW

## 2024-03-29 NOTE — HISTORY OF PRESENT ILLNESS
[< 20 pack-years] : < 20 pack-years [Former] : is a former smoker [___ Year Quit] : ~He/She~ quit smoking in [unfilled] [___ Pack Year History] : [unfilled] pack year history [None] : The patient is not currently on any medications [TextBox_29] : Discontinued all smoking in her mid 20s [FreeTextEntry1] : s/p 1  st dx COVID 19 Jan 2024  no residual sxs no anti viral ie Paxlovid  CPE 12/28/23 s/p Bone Density 12 22 osteopenia Rheum f/u per patient significant improvement  R knee arthroscopy Tuesday 6/20/23  [facility] [place]  GYN up to date No active complaints at this time Past medical history sarcoidosis Diagnosis with with a skin biopsy dating back to 1985 Borderline hypercholesterolemia Hayfever Some arthritic changes Reported history of aortic insufficiency Mild anemia reported colonoscopy 2015 - told 10 yr f/u  mammogram  pt states up to date with GYN Past surgical history right knee surgery x2 Social history minimal tobacco smoker ages [age] less than 1 pack/day Alcohol social with wine Occupational history unremarkable  No history of toxic chemical inhalational exposures Postmenopausal Family history both mother and father living patient's questions whether father has diabetes Mother she states does not see doctors no medical diseases reported Anoscopy she states up-to-date 3 years ago was told to have one 10 years from initial colonoscopy Gynecology yearly up-to-date January including Pap smear mammogram  No prescription medications Patient patient does take multiple over-the-counter supplements Niacin 500 mg twice daily calcium 1200 mg daily iron 65 mg daily vitamin D 50 MCG's daily vitamin C 500 mg twice daily vitamin D 134 mg daily magnesium 25 250 mg daily B12 1000 mg orally fish oil 1000 mg beta-carotene 3000 mg of multivitamin  COVID PFIZER plus booster

## 2024-03-29 NOTE — PROCEDURE
[FreeTextEntry1] : EKG 12/28/23  sinus  bradycardia  [place] No BD 12/28/23  Nl flow  rates  Chest x-ray PA lateral December 28, 2023 Well visit Sarcoidosis Cardiac size normal Lung fields are clear No parenchymal infiltrates pleural effusions dominant pulmonary nodules Positive scoliosis No appreciable adenopathy in the chest   Laboratory data Orthopedics preop CBC normal range WBC 7.73 hemoglobin 12.6 hematocrit 38.5 Platelet count 274,000 Serum electrolytes normal range Serum potassium 3.7 Serum calcium 9.2 Renal function normal Creatinine 0.81 with GFR 84  EKG June 12, 2023 Normal sinus rhythm  PFT 6/16/23 [place] nl flow  rates  Lung Volumes  nl   DLCO 88 % HGB 12.6  NIOx  10  ppb WNL 6/16/23   PFT December 30, 2022 Indication sarcoidosis Flow rates normal Lung volumes normal TLC 97% predicted Diffusion normal range 88% predicted Hemoglobin 13.4  EKG 12/30/2022 Heart rate 56 Sinus bradycardia Occasional ectopy Within normal limits OK QT intervals normal range  Bone density 12/30/2022 AP spine L1-L4 osteopenia Left femoral neck osteopenia Total left hip normal Compared to data of 12/21/2020 the AP spine L1-L4 demonstrates interval improvement of the T score and interval improvement of the total left hip Follow-up 2 years  Chest x-ray PA lateral 12/30/2022 Cardiac size normal Mild left lateral scoliosis Clear lung fields without parenchymal infiltrates pleural effusions dominant pulmonary nodules No pneumothorax Jen mediastinum unremarkable no appreciable mediastinal widening Impression clear lungs Mild scoliosis  EKG 12/16/21  NSR  X-ray PA lateral December 21, 2020 History sarcoidosis Normal cardiac size Left lateral rotary scoliosis Clear lungs No parenchymal infiltrates pleural effusions dominant pulmonary nodules Jen mediastinum unremarkable Interval change compared to a chest x-ray of December 20, 2019  EKG 12/21/20 NSR  DEXA 12/21/2020 AP Spine osteoporosis HIP Left Osteopenia  Bone density December 14, 2018 spine and hip consistent with osteopenia  PFT without bronchodilator 12/202019 Flow rates normal Lung volumes normal Diffusion normal 89 % predicted HGB  12.7  Blood  draw  EKG 12/20/2019 NSR atypical normal  variant

## 2024-03-29 NOTE — DISCUSSION/SUMMARY
[FreeTextEntry1] : Well visit completed Dec 2023 new finding 1 + proteinuria - F/U 3  moths RESOLVED Osteoporsis-improved to osteopenia follow-up 2 years calcium vitamin D weightbearing exercise walking HLD Stable sarcoidosis History of aortic insufficiency  mitral valve prolapse Former tobacco smoker cardiology evaluation  Everardo Germain MD not completed ENDO- consult tx protocol declined PFT  COVID Bi Valent completed f/u 3  months

## 2024-03-29 NOTE — PHYSICAL EXAM
[General Appearance - Well Developed] : well developed [Normal Appearance] : normal appearance [Well Groomed] : well groomed [General Appearance - Well Nourished] : well nourished [No Deformities] : no deformities [General Appearance - In No Acute Distress] : no acute distress [Normal Conjunctiva] : the conjunctiva exhibited no abnormalities [Eyelids - No Xanthelasma] : the eyelids demonstrated no xanthelasmas [Normal Oropharynx] : normal oropharynx [I] : I [Neck Appearance] : the appearance of the neck was normal [Neck Cervical Mass (___cm)] : no neck mass was observed [Thyroid Diffuse Enlargement] : the thyroid was not enlarged [Jugular Venous Distention Increased] : there was no jugular-venous distention [Thyroid Nodule] : there were no palpable thyroid nodules [Heart Rate And Rhythm] : heart rate and rhythm were normal [Heart Sounds] : normal S1 and S2 [Murmurs] : no murmurs present [Arterial Pulses Normal] : the arterial pulses were normal [Exaggerated Use Of Accessory Muscles For Inspiration] : no accessory muscle use [Respiration, Rhythm And Depth] : normal respiratory rhythm and effort [Auscultation Breath Sounds / Voice Sounds] : lungs were clear to auscultation bilaterally [Lungs Percussion] : the lungs were normal to percussion [Chest Palpation] : palpation of the chest revealed no abnormalities [Bowel Sounds] : normal bowel sounds [Abdomen Soft] : soft [Abdomen Tenderness] : non-tender [Abdomen Mass (___ Cm)] : no abdominal mass palpated [Abnormal Walk] : normal gait [Gait - Sufficient For Exercise Testing] : the gait was sufficient for exercise testing [Nail Clubbing] : no clubbing of the fingernails [Cyanosis, Localized] : no localized cyanosis [] : no ischemic changes [Petechial Hemorrhages (___cm)] : no petechial hemorrhages [Deep Tendon Reflexes (DTR)] : deep tendon reflexes were 2+ and symmetric [Sensation] : the sensory exam was normal to light touch and pinprick [No Focal Deficits] : no focal deficits [Oriented To Time, Place, And Person] : oriented to person, place, and time [Impaired Insight] : insight and judgment were intact [Affect] : the affect was normal [Mood] : the mood was normal [FreeTextEntry1] : Nonicteric

## 2024-04-12 RX ORDER — HYDROCORTISONE 25 MG/G
2.5 CREAM TOPICAL DAILY
Qty: 1 | Refills: 0 | Status: ACTIVE | COMMUNITY
Start: 2022-03-16 | End: 1900-01-01

## 2024-04-29 ENCOUNTER — APPOINTMENT (OUTPATIENT)
Dept: ORTHOPEDIC SURGERY | Facility: CLINIC | Age: 59
End: 2024-04-29
Payer: COMMERCIAL

## 2024-04-29 PROCEDURE — 99213 OFFICE O/P EST LOW 20 MIN: CPT

## 2024-04-29 RX ORDER — HYALURONATE SODIUM, STABILIZED 88 MG/4 ML
88 SYRINGE (ML) INTRAARTICULAR
Qty: 1 | Refills: 0 | Status: ACTIVE | COMMUNITY
Start: 2024-04-29

## 2024-04-29 NOTE — DISCUSSION/SUMMARY
[de-identified] : I went over the pathophysiology of the patient's symptoms in great detail with the patient. I informed the patient they are due for a repeat gel injection any time for the right knee gel. She needs to avoid high-impact activities such as running and jumping or riding a treadmill. I recommend alternative activities such as riding a stationary bike or elliptical on low tension. She should focus on light weight and high repetition exercises. She should avoid squatting and kneeling. Viscosupplementation was discussed as a solution to the patient's symptoms, and we are requesting Durolane for the right knee.   All of their questions were answered. They understand and consent to the plan.   FU after authorization. We will repeat bilateral knee X-rays.

## 2024-04-29 NOTE — PHYSICAL EXAM
[de-identified] : Constitutional o Appearance : well-nourished, well developed, alert, in no acute distress  Head and Face o Head :  Inspection : atraumatic, normocephalic o Face :  Inspection : no visible rash or discoloration Respiratory o Respiratory Effort: breathing unlabored  Neurologic o Mental Status Examination :  Orientation : alert and oriented X 3 Psychiatric o Mood and Affect: mood normal, affect appropriate Cardiovascular o Observation/Palpation : - no swelling Lymphatic o Additional Nodes : No palpable lymph nodes present  Right Lower Extremity o Buttock : no tenderness, swelling or deformities  o Right Hip :  Inspection/Palpation : no tenderness, swelling or deformities  Range of Motion : full and painless in all planes, no crepitance  Stability : joint stability intact  Strength : extension, flexion, adduction, abduction, internal rotation and external rotation, 5/5  o Right Knee :  Inspection/Palpation : no medial compartment tenderness to palpation, no swelling, well-healed incisions , lacks full extension, small bakers cyst   Range of Motion : 2-145, decreased patellofemoral glide   Stability : no valgus or varus instability present on provocative testing  Strength : flexion and extension 5/5  Tests and Signs : negative Anterior Drawer, negative Lachman, negative Margareth  Left Lower Extremity o Buttock : no tenderness, swelling or deformities  o Left Hip :  Inspection/Palpation : no tenderness, no swelling or deformities  Range of Motion : full and painless in all planes, no crepitance  Stability : joint stability intact  Strength : extension, flexion, adduction, abduction, internal rotation and external rotation, 5/5  o Left Knee :  Inspection/Palpation : no medial compartment tenderness or lateral compartment tenderness tenderness to palpation, no swelling, small bakers cyst   Range of Motion : full extension and flexion to -5- 145 , no crepitance  Stability : no valgus or varus instability present on provocative testing  Strength : flexion and extension 5/5  Tests and Signs : negative Anterior Drawer, negative Lachman, negative Margareth  Gait and Station: Gait: heel/toe on the right , no significant extremity swelling or lymphedema, good proprioception and balance

## 2024-04-29 NOTE — ADDENDUM
[FreeTextEntry1] : I, TINO AGUILERA, acted solely as a scribe for Dr. Travis Morales on this date 04/29/2024.  All medical record entries made by the Scribe were at my, Dr. Travis Morales, direction and personally dictated by me on 04/29/2024. I have reviewed the chart and agree that the record accurately reflects my personal performance of the history, physical exam, assessment and plan. I have also personally directed, reviewed, and agreed with the chart.

## 2024-05-31 ENCOUNTER — RX RENEWAL (OUTPATIENT)
Age: 59
End: 2024-05-31

## 2024-05-31 RX ORDER — ROSUVASTATIN CALCIUM 5 MG/1
5 TABLET, FILM COATED ORAL
Qty: 30 | Refills: 3 | Status: ACTIVE | COMMUNITY
Start: 2022-12-31 | End: 1900-01-01

## 2024-06-17 ENCOUNTER — APPOINTMENT (OUTPATIENT)
Dept: ORTHOPEDIC SURGERY | Facility: CLINIC | Age: 59
End: 2024-06-17
Payer: COMMERCIAL

## 2024-06-17 DIAGNOSIS — M17.0 BILATERAL PRIMARY OSTEOARTHRITIS OF KNEE: ICD-10-CM

## 2024-06-17 PROCEDURE — 73564 X-RAY EXAM KNEE 4 OR MORE: CPT | Mod: 50

## 2024-06-17 PROCEDURE — 99213 OFFICE O/P EST LOW 20 MIN: CPT

## 2024-06-17 RX ORDER — HYALURONATE SODIUM, STABILIZED 88 MG/4 ML
88 SYRINGE (ML) INTRAARTICULAR
Qty: 2 | Refills: 0 | Status: ACTIVE | COMMUNITY
Start: 2024-06-17

## 2024-06-17 NOTE — PHYSICAL EXAM
[de-identified] : Constitutional o Appearance : well-nourished, well developed, alert, in no acute distress  Head and Face o Head :  Inspection : atraumatic, normocephalic o Face :  Inspection : no visible rash or discoloration Respiratory o Respiratory Effort: breathing unlabored  Neurologic o Mental Status Examination :  Orientation : alert and oriented X 3 Psychiatric o Mood and Affect: mood normal, affect appropriate Cardiovascular o Observation/Palpation : - no swelling Lymphatic o Additional Nodes : No palpable lymph nodes present  Right Lower Extremity o Buttock : no tenderness, swelling or deformities  o Right Hip :  Inspection/Palpation : no tenderness, swelling or deformities  Range of Motion : full and painless in all planes, no crepitance  Stability : joint stability intact  Strength : extension, flexion, adduction, abduction, internal rotation and external rotation, 5/5  o Right Knee :  Inspection/Palpation : no medial compartment tenderness to palpation, no swelling, well-healed incisions , lacks full extension, small bakers cyst   Range of Motion : 0-140, good patellofemoral glide   Stability : no valgus or varus instability present on provocative testing  Strength : flexion and extension 5/5  Tests and Signs : negative Anterior Drawer, negative Lachman, negative Margareth  Left Lower Extremity o Buttock : no tenderness, swelling or deformities  o Left Hip :  Inspection/Palpation : no tenderness, no swelling or deformities  Range of Motion : full and painless in all planes, no crepitance  Stability : joint stability intact  Strength : extension, flexion, adduction, abduction, internal rotation and external rotation, 5/5  o Left Knee :  Inspection/Palpation : no medial compartment tenderness or lateral compartment tenderness tenderness to palpation, no swelling, small bakers cyst   Range of Motion : full extension and flexion to 0- 140 , no crepitance  Stability : no valgus or varus instability present on provocative testing  Strength : flexion and extension 5/5  Tests and Signs : negative Anterior Drawer, negative Lachman, negative Margareth  Gait and Station: Gait: heel/toe on the right , no significant extremity swelling or lymphedema, good proprioception and balance  RT knee Standing AP, Lateral, [place], and Tunnel views moderate medial and patellar femoral arthritis w/ mild patellar ANGUS   LT knee Standing AP, Lateral, [place], and Tunnel views mod medial and patellar femoral arthritis w/ mild patellar ANGUS

## 2024-06-17 NOTE — ADDENDUM
[FreeTextEntry1] : I, Penny Pantoja, acted solely as a scribe for Dr. Travis Morales MD on this date 06/17/2024   All medical record entries made by the Scribe were at my, Dr. Travis Morales MD., direction and personally dictated by me on 06/17/2024 . I have reviewed the chart and agree that the record accurately reflects my personal performance of the history, physical exam, assessment and plan. I have also personally directed, reviewed, and agreed with the chart.

## 2024-06-17 NOTE — HISTORY OF PRESENT ILLNESS
[de-identified] : Patient is a 58-year-old female who presents for f/u evaluation. Patient is s/p RT knee a arthroscopy dos 06.20.2023. Reports persistent RT Knee sxs. Clicking noises with straight leg raise. Denies any buckling.   04.29.24 The patient is a 58-year-old female who presents for a left knee follow-up. She had a left knee Durolane injection on 1/16/2024. She denies any swelling or buckling. She had a right knee Durolane injection on 10/30/2023. She states she is still having right knee pain. She states her right knee is not fully straight. She states her right knee is cracking and its uncomfortable. She goes into recurvatum She denies any swelling or buckling. She is s/p right knee arthroscopy on 06/20/2023.  PMHx of HLD, osteoporosis (now osteopenia). S/P right knee arthroscopy x2, last 8 years ago with Dr. Morales.   Radiology Results  o Left Knee : Standing AP, lateral and tunnel views of the knee were obtained and revealed moderate tricompartmental osteoarthritis worse in the lateral compartment  X-rays done with Dr. Oswald on 04/24/2023: o Right Knee : Standing AP, lateral, and skyline views were obtained and revealed moderate to severe medial and mild patellofemoral arthritis, no lytic lesions or fracture visible.

## 2024-06-17 NOTE — DISCUSSION/SUMMARY
[de-identified] : I went over the pathophysiology of the patient's symptoms in great detail with the patient. At this time, I recommended light weight high repetition exercises. Patient is awaiting approval for bilateral knee injections. She may obtain LT knee injection after July 16, 2024.  All of their questions were answered. They understand and consent to the plan.   FU after authorization.

## 2024-06-24 ENCOUNTER — APPOINTMENT (OUTPATIENT)
Dept: PULMONOLOGY | Facility: CLINIC | Age: 59
End: 2024-06-24
Payer: COMMERCIAL

## 2024-06-24 VITALS — DIASTOLIC BLOOD PRESSURE: 69 MMHG | HEART RATE: 62 BPM | SYSTOLIC BLOOD PRESSURE: 108 MMHG | OXYGEN SATURATION: 100 %

## 2024-06-24 DIAGNOSIS — D86.9 SARCOIDOSIS, UNSPECIFIED: ICD-10-CM

## 2024-06-24 DIAGNOSIS — R06.09 OTHER FORMS OF DYSPNEA: ICD-10-CM

## 2024-06-24 LAB — POCT - HEMOGLOBIN (HGB), QUANTITATIVE, TRANSCUTANEOUS: 12.9

## 2024-06-24 PROCEDURE — 94727 GAS DIL/WSHOT DETER LNG VOL: CPT

## 2024-06-24 PROCEDURE — 88738 HGB QUANT TRANSCUTANEOUS: CPT

## 2024-06-24 PROCEDURE — 99214 OFFICE O/P EST MOD 30 MIN: CPT | Mod: 25

## 2024-06-24 PROCEDURE — 94010 BREATHING CAPACITY TEST: CPT

## 2024-06-24 PROCEDURE — 95012 NITRIC OXIDE EXP GAS DETER: CPT

## 2024-06-24 PROCEDURE — 94618 PULMONARY STRESS TESTING: CPT

## 2024-06-24 PROCEDURE — 94729 DIFFUSING CAPACITY: CPT

## 2024-07-08 ENCOUNTER — APPOINTMENT (OUTPATIENT)
Dept: OBGYN | Facility: CLINIC | Age: 59
End: 2024-07-08
Payer: COMMERCIAL

## 2024-07-08 VITALS
DIASTOLIC BLOOD PRESSURE: 78 MMHG | SYSTOLIC BLOOD PRESSURE: 119 MMHG | BODY MASS INDEX: 24.48 KG/M2 | HEIGHT: 62 IN | WEIGHT: 133 LBS

## 2024-07-08 DIAGNOSIS — Z11.51 ENCOUNTER FOR SCREENING FOR HUMAN PAPILLOMAVIRUS (HPV): ICD-10-CM

## 2024-07-08 DIAGNOSIS — Z01.419 ENCOUNTER FOR GYNECOLOGICAL EXAMINATION (GENERAL) (ROUTINE) W/OUT ABNORMAL FINDINGS: ICD-10-CM

## 2024-07-08 PROCEDURE — 99396 PREV VISIT EST AGE 40-64: CPT

## 2024-07-11 ENCOUNTER — TRANSCRIPTION ENCOUNTER (OUTPATIENT)
Age: 59
End: 2024-07-11

## 2024-07-11 LAB — HPV HIGH+LOW RISK DNA PNL CVX: NOT DETECTED

## 2024-08-12 ENCOUNTER — RESULT REVIEW (OUTPATIENT)
Age: 59
End: 2024-08-12

## 2024-08-12 ENCOUNTER — APPOINTMENT (OUTPATIENT)
Dept: ULTRASOUND IMAGING | Facility: CLINIC | Age: 59
End: 2024-08-12
Payer: COMMERCIAL

## 2024-08-12 ENCOUNTER — APPOINTMENT (OUTPATIENT)
Dept: MAMMOGRAPHY | Facility: CLINIC | Age: 59
End: 2024-08-12
Payer: COMMERCIAL

## 2024-08-12 PROCEDURE — 77063 BREAST TOMOSYNTHESIS BI: CPT

## 2024-08-12 PROCEDURE — 76641 ULTRASOUND BREAST COMPLETE: CPT | Mod: 50

## 2024-08-12 PROCEDURE — 77067 SCR MAMMO BI INCL CAD: CPT

## 2024-08-13 ENCOUNTER — APPOINTMENT (OUTPATIENT)
Dept: ORTHOPEDIC SURGERY | Facility: CLINIC | Age: 59
End: 2024-08-13
Payer: COMMERCIAL

## 2024-08-13 VITALS — HEIGHT: 62 IN | BODY MASS INDEX: 24.48 KG/M2 | WEIGHT: 133 LBS

## 2024-08-13 DIAGNOSIS — M17.0 BILATERAL PRIMARY OSTEOARTHRITIS OF KNEE: ICD-10-CM

## 2024-08-13 PROCEDURE — 20611 DRAIN/INJ JOINT/BURSA W/US: CPT | Mod: RT

## 2024-08-13 NOTE — PHYSICAL EXAM
[de-identified] : Constitutional o Appearance : well-nourished, well developed, alert, in no acute distress  Head and Face o Head :  Inspection : atraumatic, normocephalic o Face :  Inspection : no visible rash or discoloration Respiratory o Respiratory Effort: breathing unlabored  Neurologic o Mental Status Examination :  Orientation : alert and oriented X 3 Psychiatric o Mood and Affect: mood normal, affect appropriate Cardiovascular o Observation/Palpation : - no swelling Lymphatic o Additional Nodes : No palpable lymph nodes present  Right Lower Extremity o Buttock : no tenderness, swelling or deformities  o Right Hip :  Inspection/Palpation : no tenderness, swelling or deformities  Range of Motion : full and painless in all planes, no crepitance  Stability : joint stability intact  Strength : extension, flexion, adduction, abduction, internal rotation and external rotation, 5/5  o Right Knee :  Inspection/Palpation : no medial compartment tenderness to palpation, no swelling, well-healed incisions , lacks full extension, small bakers cyst   Range of Motion : 0-140, good patellofemoral glide   Stability : no valgus or varus instability present on provocative testing  Strength : flexion and extension 5/5  Tests and Signs : negative Anterior Drawer, negative Lachman, negative Margareth  Left Lower Extremity o Buttock : no tenderness, swelling or deformities  o Left Hip :  Inspection/Palpation : no tenderness, no swelling or deformities  Range of Motion : full and painless in all planes, no crepitance  Stability : joint stability intact  Strength : extension, flexion, adduction, abduction, internal rotation and external rotation, 5/5  o Left Knee :  Inspection/Palpation : no medial compartment tenderness or lateral compartment tenderness tenderness to palpation, no swelling, small bakers cyst   Range of Motion : full extension and flexion to 0- 140 , no crepitance  Stability : no valgus or varus instability present on provocative testing  Strength : flexion and extension 5/5  Tests and Signs : negative Anterior Drawer, negative Lachman, negative Margareth  Gait and Station: Gait: heel/toe on the right , no significant extremity swelling or lymphedema, good proprioception and balance  o Knee injection : Indication- right knee osteoarthritis, Anatomic location- right intra-articular joint space, Spray - area was sterilized with Betadine and alcohol and anesthetized with Ethyl Chloride , needle used-20G, Medications given- 3cc's Durolane under Ultrasound guidance. The patient tolerated the procedure well.

## 2024-08-13 NOTE — DISCUSSION/SUMMARY
[de-identified] : I went over the pathophysiology of the patient's symptoms in great detail with the patient. The patient elected to receive a Durolane injection into her right knee today, and tolerated it well. I instructed the patient on ROM exercises, and told them to take it easy. The use of ice and rest was reviewed with the patient. The patient may resume activities tomorrow. I reminded the patient that it takes 4 to 6 weeks after the final injection to feel symptom relief.  We have gone over how to stretch her hamstrings and increase her extension on the right knee.  All of their questions were answered. They understand and consent to the plan.   FU anytime for a left knee gel injection.

## 2024-08-13 NOTE — ADDENDUM
[FreeTextEntry1] : I, TINO AGUILERA, acted solely as a scribe for Dr. Travis Morales on this date 08/13/2024.  All medical record entries made by the Scribe were at my, Dr. Travis Morales, direction and personally dictated by me on 08/13/2024. I have reviewed the chart and agree that the record accurately reflects my personal performance of the history, physical exam, assessment and plan. I have also personally directed, reviewed, and agreed with the chart.

## 2024-08-13 NOTE — HISTORY OF PRESENT ILLNESS
[de-identified] : The patient is a 58-year-old female who presents for a right knee Durolane injection today 8/13/2024. She denies any swelling or buckling. She is s/p right knee arthroscopy on 06/20/2023. PMHx of HLD, osteoporosis (now osteopenia). S/P right knee arthroscopy x2, last 8 years ago with Dr. Morales.   6/17/2024 RT knee Standing AP, Lateral, [place], and Tunnel views moderate medial and patellar femoral arthritis w/ mild patellar ANGUS   LT knee Standing AP, Lateral, [place], and Tunnel views mod medial and patellar femoral arthritis w/ mild patellar ANGUS   Radiology Results  o Left Knee : Standing AP, lateral and tunnel views of the knee were obtained and revealed moderate tricompartmental osteoarthritis worse in the lateral compartment  X-rays done with Dr. Oswald on 04/24/2023: o Right Knee : Standing AP, lateral, and skyline views were obtained and revealed moderate to severe medial and mild patellofemoral arthritis, no lytic lesions or fracture visible.

## 2024-08-27 ENCOUNTER — APPOINTMENT (OUTPATIENT)
Dept: ORTHOPEDIC SURGERY | Facility: CLINIC | Age: 59
End: 2024-08-27
Payer: COMMERCIAL

## 2024-08-27 VITALS — WEIGHT: 133 LBS | BODY MASS INDEX: 24.48 KG/M2 | HEIGHT: 62 IN

## 2024-08-27 DIAGNOSIS — M17.0 BILATERAL PRIMARY OSTEOARTHRITIS OF KNEE: ICD-10-CM

## 2024-08-27 PROCEDURE — 99213 OFFICE O/P EST LOW 20 MIN: CPT | Mod: 25

## 2024-08-27 PROCEDURE — 20611 DRAIN/INJ JOINT/BURSA W/US: CPT | Mod: LT

## 2024-08-27 NOTE — DISCUSSION/SUMMARY
[de-identified] : I went over the pathophysiology of the patient's symptoms in great detail with the patient. At-home strengthening exercises were discussed and demonstrated with the patient. We discussed the use of ice, Tylenol and anti-inflammatories to relieve pain. The patient elected to receive a Durolane injection into her left knee today, and tolerated it well. I instructed the patient on ROM exercises, and told them to take it easy. The use of ice and rest was reviewed with the patient. The patient may resume activities tomorrow. I reminded the patient that it takes 4 to 6 weeks after the final injection to feel symptom relief.   All of their questions were answered. They understand and consent to the plan.   FU in 6 weeks.

## 2024-08-27 NOTE — ADDENDUM
[FreeTextEntry1] : I, TINO AGUILERA, acted solely as a scribe for Dr. Travis Morales on this date 08/27/2024.  All medical record entries made by the Scribe were at my, Dr. Travis Morales, direction and personally dictated by me on 08/27/2024. I have reviewed the chart and agree that the record accurately reflects my personal performance of the history, physical exam, assessment and plan. I have also personally directed, reviewed, and agreed with the chart.

## 2024-08-27 NOTE — HISTORY OF PRESENT ILLNESS
[de-identified] : The patient is a 58-year-old female who presents a bilateral knee follow-up. She had a right knee Durolane injection on 8/13/2024. She denies any swelling or buckling. She notes crunching of her right knee with extension. She is interested in a left Durolane injction today. She is s/p right knee arthroscopy on 06/20/2023. PMHx of HLD, osteoporosis (now osteopenia). S/P right knee arthroscopy x2, last 8 years ago with Dr. Morales.   6/17/2024 RT knee Standing AP, Lateral, [place], and Tunnel views moderate medial and patellar femoral arthritis w/ mild patellar ANGUS   LT knee Standing AP, Lateral, [place], and Tunnel views mod medial and patellar femoral arthritis w/ mild patellar ANGUS   Radiology Results  o Left Knee : Standing AP, lateral and tunnel views of the knee were obtained and revealed moderate tricompartmental osteoarthritis worse in the lateral compartment  X-rays done with Dr. Oswald on 04/24/2023: o Right Knee : Standing AP, lateral, and skyline views were obtained and revealed moderate to severe medial and mild patellofemoral arthritis, no lytic lesions or fracture visible.

## 2024-08-27 NOTE — PHYSICAL EXAM
[de-identified] : Constitutional o Appearance : well-nourished, well developed, alert, in no acute distress  Head and Face o Head :  Inspection : atraumatic, normocephalic o Face :  Inspection : no visible rash or discoloration Respiratory o Respiratory Effort: breathing unlabored  Neurologic o Mental Status Examination :  Orientation : alert and oriented X 3 Psychiatric o Mood and Affect: mood normal, affect appropriate Cardiovascular o Observation/Palpation : - no swelling Lymphatic o Additional Nodes : No palpable lymph nodes present  Right Lower Extremity o Buttock : no tenderness, swelling or deformities  o Right Hip :  Inspection/Palpation : no tenderness, swelling or deformities  Range of Motion : full and painless in all planes, no crepitance  Stability : joint stability intact  Strength : extension, flexion, adduction, abduction, internal rotation and external rotation, 5/5  o Right Knee :  Inspection/Palpation : no medial compartment tenderness to palpation, no swelling, well-healed incisions , lacks full extension, small bakers cyst   Range of Motion : 0-145, good patellofemoral glide   Stability : no valgus or varus instability present on provocative testing  Strength : flexion and extension 5/5  Tests and Signs : negative Anterior Drawer, negative Lachman, negative Margareth  Left Lower Extremity o Buttock : no tenderness, swelling or deformities  o Left Hip :  Inspection/Palpation : no tenderness, no swelling or deformities  Range of Motion : full and painless in all planes, no crepitance  Stability : joint stability intact  Strength : extension, flexion, adduction, abduction, internal rotation and external rotation, 5/5  o Left Knee :  Inspection/Palpation : no medial compartment tenderness or lateral compartment tenderness tenderness to palpation, no swelling, small bakers cyst   Range of Motion : 0-145 full extension and flexion to 0- 140 , no crepitance  Stability : no valgus or varus instability present on provocative testing  Strength : flexion and extension 5/5  Tests and Signs : negative Anterior Drawer, negative Lachman, negative Margareth  Gait and Station: Gait: heel/toe on the right , no significant extremity swelling or lymphedema, good proprioception and balance  o Knee injection : Indication- left knee osteoarthritis, Anatomic location- left intra-articular joint space, Spray - area was sterilized with Betadine and alcohol and anesthetized with Ethyl Chloride , needle used-20G, Medications given- 3cc's Durolane under Ultrasound guidance. The patient tolerated the procedure well.

## 2024-09-09 ENCOUNTER — NON-APPOINTMENT (OUTPATIENT)
Age: 59
End: 2024-09-09

## 2024-10-03 ENCOUNTER — RX RENEWAL (OUTPATIENT)
Age: 59
End: 2024-10-03

## 2024-10-08 ENCOUNTER — APPOINTMENT (OUTPATIENT)
Dept: ORTHOPEDIC SURGERY | Facility: CLINIC | Age: 59
End: 2024-10-08
Payer: COMMERCIAL

## 2024-10-08 VITALS — HEIGHT: 62 IN | BODY MASS INDEX: 24.48 KG/M2 | WEIGHT: 133 LBS

## 2024-10-08 DIAGNOSIS — M17.0 BILATERAL PRIMARY OSTEOARTHRITIS OF KNEE: ICD-10-CM

## 2024-10-08 PROCEDURE — 99214 OFFICE O/P EST MOD 30 MIN: CPT

## 2024-12-23 ENCOUNTER — LABORATORY RESULT (OUTPATIENT)
Age: 59
End: 2024-12-23

## 2024-12-23 ENCOUNTER — NON-APPOINTMENT (OUTPATIENT)
Age: 59
End: 2024-12-23

## 2024-12-23 ENCOUNTER — APPOINTMENT (OUTPATIENT)
Dept: PULMONOLOGY | Facility: CLINIC | Age: 59
End: 2024-12-23
Payer: COMMERCIAL

## 2024-12-23 VITALS
DIASTOLIC BLOOD PRESSURE: 79 MMHG | OXYGEN SATURATION: 99 % | HEART RATE: 60 BPM | SYSTOLIC BLOOD PRESSURE: 129 MMHG | RESPIRATION RATE: 16 BRPM

## 2024-12-23 DIAGNOSIS — Z00.00 ENCOUNTER FOR GENERAL ADULT MEDICAL EXAMINATION W/OUT ABNORMAL FINDINGS: ICD-10-CM

## 2024-12-23 DIAGNOSIS — I34.1 NONRHEUMATIC MITRAL (VALVE) PROLAPSE: ICD-10-CM

## 2024-12-23 DIAGNOSIS — D86.9 SARCOIDOSIS, UNSPECIFIED: ICD-10-CM

## 2024-12-23 DIAGNOSIS — E78.5 HYPERLIPIDEMIA, UNSPECIFIED: ICD-10-CM

## 2024-12-23 LAB
ALBUMIN: 30
BILIRUB UR QL STRIP: NORMAL
CLARITY UR: CLEAR
COLLECTION METHOD: NORMAL
CREATININE: 200
GLUCOSE UR-MCNC: NORMAL
HCG UR QL: 0.2 EU/DL
HGB UR QL STRIP.AUTO: NORMAL
KETONES UR-MCNC: NORMAL
LEUKOCYTE ESTERASE UR QL STRIP: NORMAL
MICROALBUMIN/CREAT UR TEST STR-RTO: <30
NITRITE UR QL STRIP: NORMAL
PH UR STRIP: 7
POCT - HEMOGLOBIN (HGB), QUANTITATIVE, TRANSCUTANEOUS: 11.4
PROT UR STRIP-MCNC: NORMAL
SP GR UR STRIP: 1.02

## 2024-12-23 PROCEDURE — 95012 NITRIC OXIDE EXP GAS DETER: CPT

## 2024-12-23 PROCEDURE — ZZZZZ: CPT

## 2024-12-23 PROCEDURE — 82044 UR ALBUMIN SEMIQUANTITATIVE: CPT | Mod: NC,QW

## 2024-12-23 PROCEDURE — 81003 URINALYSIS AUTO W/O SCOPE: CPT | Mod: QW

## 2024-12-23 PROCEDURE — 94010 BREATHING CAPACITY TEST: CPT

## 2024-12-23 PROCEDURE — 71046 X-RAY EXAM CHEST 2 VIEWS: CPT

## 2024-12-23 PROCEDURE — 90677 PCV20 VACCINE IM: CPT

## 2024-12-23 PROCEDURE — 93000 ELECTROCARDIOGRAM COMPLETE: CPT

## 2024-12-23 PROCEDURE — 90471 IMMUNIZATION ADMIN: CPT

## 2024-12-23 PROCEDURE — 88738 HGB QUANT TRANSCUTANEOUS: CPT

## 2024-12-23 PROCEDURE — 94727 GAS DIL/WSHOT DETER LNG VOL: CPT

## 2024-12-23 PROCEDURE — 94729 DIFFUSING CAPACITY: CPT

## 2024-12-23 PROCEDURE — 99396 PREV VISIT EST AGE 40-64: CPT | Mod: 25

## 2024-12-24 ENCOUNTER — NON-APPOINTMENT (OUTPATIENT)
Age: 59
End: 2024-12-24

## 2024-12-24 LAB
25(OH)D3 SERPL-MCNC: 21.9 NG/ML
ALBUMIN SERPL ELPH-MCNC: 4.4 G/DL
ALP BLD-CCNC: 76 U/L
ALT SERPL-CCNC: 12 U/L
ANION GAP SERPL CALC-SCNC: 13 MMOL/L
AST SERPL-CCNC: 20 U/L
BASOPHILS # BLD AUTO: 0.04 K/UL
BASOPHILS NFR BLD AUTO: 0.7 %
BILIRUB DIRECT SERPL-MCNC: 0.1 MG/DL
BILIRUB INDIRECT SERPL-MCNC: 0.1 MG/DL
BILIRUB SERPL-MCNC: 0.2 MG/DL
BUN SERPL-MCNC: 14 MG/DL
CALCIUM SERPL-MCNC: 9.6 MG/DL
CHLORIDE SERPL-SCNC: 104 MMOL/L
CHOLEST SERPL-MCNC: 174 MG/DL
CO2 SERPL-SCNC: 26 MMOL/L
CREAT SERPL-MCNC: 0.8 MG/DL
EGFR: 84 ML/MIN/1.73M2
EOSINOPHIL # BLD AUTO: 0.17 K/UL
EOSINOPHIL NFR BLD AUTO: 3 %
ESTIMATED AVERAGE GLUCOSE: 114 MG/DL
GLUCOSE SERPL-MCNC: 97 MG/DL
HBA1C MFR BLD HPLC: 5.6 %
HCT VFR BLD CALC: 37.7 %
HDLC SERPL-MCNC: 79 MG/DL
HGB BLD-MCNC: 12.1 G/DL
IMM GRANULOCYTES NFR BLD AUTO: 0.2 %
LDLC SERPL CALC-MCNC: 85 MG/DL
LYMPHOCYTES # BLD AUTO: 2.48 K/UL
LYMPHOCYTES NFR BLD AUTO: 43.5 %
MAN DIFF?: NORMAL
MCHC RBC-ENTMCNC: 28.7 PG
MCHC RBC-ENTMCNC: 32.1 G/DL
MCV RBC AUTO: 89.5 FL
MONOCYTES # BLD AUTO: 0.42 K/UL
MONOCYTES NFR BLD AUTO: 7.4 %
NEUTROPHILS # BLD AUTO: 2.58 K/UL
NEUTROPHILS NFR BLD AUTO: 45.2 %
NONHDLC SERPL-MCNC: 95 MG/DL
PLATELET # BLD AUTO: 214 K/UL
POTASSIUM SERPL-SCNC: 4.6 MMOL/L
PROT SERPL-MCNC: 6.4 G/DL
RBC # BLD: 4.21 M/UL
RBC # FLD: 13.6 %
SODIUM SERPL-SCNC: 142 MMOL/L
T3 SERPL-MCNC: 104 NG/DL
T3RU NFR SERPL: 1 TBI
T4 FREE SERPL-MCNC: 1.2 NG/DL
T4 SERPL-MCNC: 7.5 UG/DL
TRIGL SERPL-MCNC: 50 MG/DL
TSH SERPL-ACNC: 1.84 UIU/ML
WBC # FLD AUTO: 5.7 K/UL

## 2025-01-13 ENCOUNTER — APPOINTMENT (OUTPATIENT)
Dept: ORTHOPEDIC SURGERY | Facility: CLINIC | Age: 60
End: 2025-01-13
Payer: COMMERCIAL

## 2025-01-13 DIAGNOSIS — M17.0 BILATERAL PRIMARY OSTEOARTHRITIS OF KNEE: ICD-10-CM

## 2025-01-13 PROCEDURE — 99213 OFFICE O/P EST LOW 20 MIN: CPT

## 2025-01-13 RX ORDER — HYALURONATE SODIUM 20 MG/2 ML
20 SYRINGE (ML) INTRAARTICULAR
Qty: 2 | Refills: 0 | Status: ACTIVE | COMMUNITY
Start: 2025-01-13

## 2025-02-24 ENCOUNTER — APPOINTMENT (OUTPATIENT)
Dept: ORTHOPEDIC SURGERY | Facility: CLINIC | Age: 60
End: 2025-02-24
Payer: COMMERCIAL

## 2025-02-24 DIAGNOSIS — M17.0 BILATERAL PRIMARY OSTEOARTHRITIS OF KNEE: ICD-10-CM

## 2025-02-24 PROCEDURE — 20610 DRAIN/INJ JOINT/BURSA W/O US: CPT | Mod: RT

## 2025-02-24 PROCEDURE — 99213 OFFICE O/P EST LOW 20 MIN: CPT | Mod: 25

## 2025-03-03 ENCOUNTER — APPOINTMENT (OUTPATIENT)
Dept: ORTHOPEDIC SURGERY | Facility: CLINIC | Age: 60
End: 2025-03-03
Payer: COMMERCIAL

## 2025-03-03 ENCOUNTER — NON-APPOINTMENT (OUTPATIENT)
Age: 60
End: 2025-03-03

## 2025-03-03 VITALS — BODY MASS INDEX: 24.48 KG/M2 | HEIGHT: 62 IN | WEIGHT: 133 LBS

## 2025-03-03 PROCEDURE — 20610 DRAIN/INJ JOINT/BURSA W/O US: CPT | Mod: 50

## 2025-03-10 ENCOUNTER — APPOINTMENT (OUTPATIENT)
Dept: ORTHOPEDIC SURGERY | Facility: CLINIC | Age: 60
End: 2025-03-10
Payer: COMMERCIAL

## 2025-03-10 PROCEDURE — 20610 DRAIN/INJ JOINT/BURSA W/O US: CPT | Mod: 50

## 2025-03-14 ENCOUNTER — APPOINTMENT (OUTPATIENT)
Dept: PULMONOLOGY | Facility: CLINIC | Age: 60
End: 2025-03-14
Payer: COMMERCIAL

## 2025-03-14 VITALS
DIASTOLIC BLOOD PRESSURE: 75 MMHG | RESPIRATION RATE: 16 BRPM | OXYGEN SATURATION: 96 % | SYSTOLIC BLOOD PRESSURE: 118 MMHG | HEART RATE: 72 BPM

## 2025-03-14 DIAGNOSIS — Z11.59 ENCOUNTER FOR SCREENING FOR OTHER VIRAL DISEASES: ICD-10-CM

## 2025-03-14 PROCEDURE — 36415 COLL VENOUS BLD VENIPUNCTURE: CPT

## 2025-03-14 PROCEDURE — 99213 OFFICE O/P EST LOW 20 MIN: CPT

## 2025-03-15 ENCOUNTER — NON-APPOINTMENT (OUTPATIENT)
Age: 60
End: 2025-03-15

## 2025-03-15 LAB
MEV IGG FLD QL IA: 87.4 AU/ML
MEV IGG+IGM SER-IMP: POSITIVE
MUV AB SER-ACNC: POSITIVE
MUV IGG SER QL IA: 38.9 AU/ML
RUBV IGG FLD-ACNC: 1.04 INDEX
RUBV IGG SER-IMP: POSITIVE

## 2025-03-24 ENCOUNTER — APPOINTMENT (OUTPATIENT)
Dept: ORTHOPEDIC SURGERY | Facility: CLINIC | Age: 60
End: 2025-03-24
Payer: COMMERCIAL

## 2025-03-24 VITALS — BODY MASS INDEX: 24.48 KG/M2 | HEIGHT: 62 IN | WEIGHT: 133 LBS

## 2025-03-24 DIAGNOSIS — M17.0 BILATERAL PRIMARY OSTEOARTHRITIS OF KNEE: ICD-10-CM

## 2025-03-24 PROCEDURE — 20610 DRAIN/INJ JOINT/BURSA W/O US: CPT | Mod: LT

## 2025-05-05 ENCOUNTER — APPOINTMENT (OUTPATIENT)
Dept: ORTHOPEDIC SURGERY | Facility: CLINIC | Age: 60
End: 2025-05-05
Payer: COMMERCIAL

## 2025-05-05 DIAGNOSIS — M17.0 BILATERAL PRIMARY OSTEOARTHRITIS OF KNEE: ICD-10-CM

## 2025-05-05 PROCEDURE — 99213 OFFICE O/P EST LOW 20 MIN: CPT

## 2025-06-23 ENCOUNTER — APPOINTMENT (OUTPATIENT)
Dept: PULMONOLOGY | Facility: CLINIC | Age: 60
End: 2025-06-23
Payer: COMMERCIAL

## 2025-06-23 VITALS — HEART RATE: 60 BPM | DIASTOLIC BLOOD PRESSURE: 77 MMHG | OXYGEN SATURATION: 100 % | SYSTOLIC BLOOD PRESSURE: 120 MMHG

## 2025-06-23 LAB — POCT - HEMOGLOBIN (HGB), QUANTITATIVE, TRANSCUTANEOUS: 12.9

## 2025-06-23 PROCEDURE — ZZZZZ: CPT

## 2025-06-23 PROCEDURE — 94727 GAS DIL/WSHOT DETER LNG VOL: CPT

## 2025-06-23 PROCEDURE — 94729 DIFFUSING CAPACITY: CPT

## 2025-06-23 PROCEDURE — 99214 OFFICE O/P EST MOD 30 MIN: CPT | Mod: 25

## 2025-06-23 PROCEDURE — 77085 DXA BONE DENSITY AXL VRT FX: CPT

## 2025-06-23 PROCEDURE — 88738 HGB QUANT TRANSCUTANEOUS: CPT

## 2025-06-23 PROCEDURE — 94010 BREATHING CAPACITY TEST: CPT

## 2025-07-07 ENCOUNTER — APPOINTMENT (OUTPATIENT)
Dept: OBGYN | Facility: CLINIC | Age: 60
End: 2025-07-07
Payer: COMMERCIAL

## 2025-07-07 ENCOUNTER — NON-APPOINTMENT (OUTPATIENT)
Age: 60
End: 2025-07-07

## 2025-07-07 VITALS
WEIGHT: 133 LBS | BODY MASS INDEX: 24.48 KG/M2 | DIASTOLIC BLOOD PRESSURE: 72 MMHG | HEIGHT: 62 IN | SYSTOLIC BLOOD PRESSURE: 118 MMHG

## 2025-07-07 PROCEDURE — 99396 PREV VISIT EST AGE 40-64: CPT

## 2025-07-11 ENCOUNTER — TRANSCRIPTION ENCOUNTER (OUTPATIENT)
Age: 60
End: 2025-07-11

## 2025-07-11 LAB
CYTOLOGY CVX/VAG DOC THIN PREP: ABNORMAL
HPV HIGH+LOW RISK DNA PNL CVX: NOT DETECTED

## 2025-08-18 ENCOUNTER — RESULT REVIEW (OUTPATIENT)
Age: 60
End: 2025-08-18

## 2025-08-18 ENCOUNTER — APPOINTMENT (OUTPATIENT)
Dept: ORTHOPEDIC SURGERY | Facility: CLINIC | Age: 60
End: 2025-08-18
Payer: COMMERCIAL

## 2025-08-18 ENCOUNTER — APPOINTMENT (OUTPATIENT)
Dept: MAMMOGRAPHY | Facility: CLINIC | Age: 60
End: 2025-08-18
Payer: COMMERCIAL

## 2025-08-18 ENCOUNTER — APPOINTMENT (OUTPATIENT)
Dept: ULTRASOUND IMAGING | Facility: CLINIC | Age: 60
End: 2025-08-18
Payer: COMMERCIAL

## 2025-08-18 VITALS — BODY MASS INDEX: 24.48 KG/M2 | HEIGHT: 62 IN | WEIGHT: 133 LBS

## 2025-08-18 DIAGNOSIS — M17.0 BILATERAL PRIMARY OSTEOARTHRITIS OF KNEE: ICD-10-CM

## 2025-08-18 PROCEDURE — 73564 X-RAY EXAM KNEE 4 OR MORE: CPT | Mod: 50

## 2025-08-18 PROCEDURE — 99213 OFFICE O/P EST LOW 20 MIN: CPT

## 2025-08-18 PROCEDURE — 76641 ULTRASOUND BREAST COMPLETE: CPT | Mod: 50

## 2025-08-18 PROCEDURE — 77063 BREAST TOMOSYNTHESIS BI: CPT

## 2025-08-18 PROCEDURE — 77067 SCR MAMMO BI INCL CAD: CPT

## 2025-08-18 RX ORDER — DICLOFENAC SODIUM 75 MG/1
75 TABLET, DELAYED RELEASE ORAL
Qty: 60 | Refills: 0 | Status: ACTIVE | COMMUNITY
Start: 2025-08-18 | End: 1900-01-01

## 2025-09-11 RX ORDER — HYALURONATE SODIUM 20 MG/2 ML
20 SYRINGE (ML) INTRAARTICULAR
Qty: 2 | Refills: 0 | Status: ACTIVE | COMMUNITY
Start: 2025-09-11

## (undated) DEVICE — BOOT COVER NONSKID IMPERVIOUS

## (undated) DEVICE — SUT MONOSOF 3-0 18" P-12

## (undated) DEVICE — NDL SPINAL 18G X 3.5" (PINK)

## (undated) DEVICE — TUBING SUCTION 20FT

## (undated) DEVICE — DVC SUCTION FLR PUDDLE GUPPY

## (undated) DEVICE — SOL IRR BAG NS 0.9% 3000ML

## (undated) DEVICE — POSITIONER FOAM HEAD CRADLE (PINK)

## (undated) DEVICE — LAP PAD 4 X 18"

## (undated) DEVICE — TOURNIQUET CUFF 34" DUAL PORT W PLC

## (undated) DEVICE — GLV 7.5 PROTEXIS (BLUE)

## (undated) DEVICE — TUBING SET GRAVITY 4 SPIKE

## (undated) DEVICE — S&N ARTHROCARE WAND COBLATION WEREWOLF FLOW 50

## (undated) DEVICE — SHAVER BLADE ULTRAGATOR 3.5MM

## (undated) DEVICE — PACK KNEE ARTHROSCOPY

## (undated) DEVICE — CANISTER SUCTION LID GUARD 3000CC

## (undated) DEVICE — VENODYNE/SCD SLEEVE CALF MEDIUM

## (undated) DEVICE — CANISTER SUCTION LINER 1500 CC

## (undated) DEVICE — WARMING BLANKET UPPER ADULT

## (undated) DEVICE — SOLIDIFIER CANN EXPRESS 3K

## (undated) DEVICE — POSITIONER PATIENT SAFETY STRAP 3X60"